# Patient Record
Sex: FEMALE | URBAN - METROPOLITAN AREA
[De-identification: names, ages, dates, MRNs, and addresses within clinical notes are randomized per-mention and may not be internally consistent; named-entity substitution may affect disease eponyms.]

---

## 2018-07-06 ENCOUNTER — EMERGENCY (EMERGENCY)
Facility: HOSPITAL | Age: 63
LOS: 1 days | Discharge: HOME | End: 2018-07-06
Attending: EMERGENCY MEDICINE | Admitting: EMERGENCY MEDICINE
Payer: MEDICAID

## 2018-07-06 VITALS
OXYGEN SATURATION: 100 % | DIASTOLIC BLOOD PRESSURE: 85 MMHG | TEMPERATURE: 98 F | HEART RATE: 76 BPM | SYSTOLIC BLOOD PRESSURE: 143 MMHG | RESPIRATION RATE: 16 BRPM

## 2018-07-06 VITALS
HEART RATE: 71 BPM | DIASTOLIC BLOOD PRESSURE: 89 MMHG | OXYGEN SATURATION: 100 % | SYSTOLIC BLOOD PRESSURE: 141 MMHG | RESPIRATION RATE: 16 BRPM

## 2018-07-06 LAB
ALBUMIN SERPL ELPH-MCNC: 3.6 G/DL — SIGNIFICANT CHANGE UP (ref 3.3–5)
ALP SERPL-CCNC: 121 U/L — HIGH (ref 40–120)
ALT FLD-CCNC: 25 U/L — SIGNIFICANT CHANGE UP (ref 4–33)
APPEARANCE UR: CLEAR — SIGNIFICANT CHANGE UP
AST SERPL-CCNC: 49 U/L — HIGH (ref 4–32)
BASE EXCESS BLDV CALC-SCNC: 0 MMOL/L — SIGNIFICANT CHANGE UP
BASE EXCESS BLDV CALC-SCNC: 0.9 MMOL/L — SIGNIFICANT CHANGE UP
BASOPHILS # BLD AUTO: 0.03 K/UL — SIGNIFICANT CHANGE UP (ref 0–0.2)
BASOPHILS NFR BLD AUTO: 0.5 % — SIGNIFICANT CHANGE UP (ref 0–2)
BILIRUB SERPL-MCNC: 0.6 MG/DL — SIGNIFICANT CHANGE UP (ref 0.2–1.2)
BILIRUB UR-MCNC: NEGATIVE — SIGNIFICANT CHANGE UP
BLOOD GAS VENOUS - CREATININE: 0.76 MG/DL — SIGNIFICANT CHANGE UP (ref 0.5–1.3)
BLOOD GAS VENOUS - CREATININE: 0.76 MG/DL — SIGNIFICANT CHANGE UP (ref 0.5–1.3)
BLOOD UR QL VISUAL: NEGATIVE — SIGNIFICANT CHANGE UP
BUN SERPL-MCNC: 8 MG/DL — SIGNIFICANT CHANGE UP (ref 7–23)
CALCIUM SERPL-MCNC: 9.9 MG/DL — SIGNIFICANT CHANGE UP (ref 8.4–10.5)
CHLORIDE BLDV-SCNC: 104 MMOL/L — SIGNIFICANT CHANGE UP (ref 96–108)
CHLORIDE BLDV-SCNC: 105 MMOL/L — SIGNIFICANT CHANGE UP (ref 96–108)
CHLORIDE SERPL-SCNC: 98 MMOL/L — SIGNIFICANT CHANGE UP (ref 98–107)
CO2 SERPL-SCNC: 24 MMOL/L — SIGNIFICANT CHANGE UP (ref 22–31)
COLOR SPEC: SIGNIFICANT CHANGE UP
CREAT SERPL-MCNC: 0.8 MG/DL — SIGNIFICANT CHANGE UP (ref 0.5–1.3)
EOSINOPHIL # BLD AUTO: 0.22 K/UL — SIGNIFICANT CHANGE UP (ref 0–0.5)
EOSINOPHIL NFR BLD AUTO: 3.9 % — SIGNIFICANT CHANGE UP (ref 0–6)
GAS PNL BLDV: 136 MMOL/L — SIGNIFICANT CHANGE UP (ref 136–146)
GAS PNL BLDV: 136 MMOL/L — SIGNIFICANT CHANGE UP (ref 136–146)
GLUCOSE BLDV-MCNC: 182 — HIGH (ref 70–99)
GLUCOSE BLDV-MCNC: 187 — HIGH (ref 70–99)
GLUCOSE SERPL-MCNC: 178 MG/DL — HIGH (ref 70–99)
GLUCOSE UR-MCNC: NEGATIVE — SIGNIFICANT CHANGE UP
HCO3 BLDV-SCNC: 24 MMOL/L — SIGNIFICANT CHANGE UP (ref 20–27)
HCO3 BLDV-SCNC: 24 MMOL/L — SIGNIFICANT CHANGE UP (ref 20–27)
HCT VFR BLD CALC: 30.3 % — LOW (ref 34.5–45)
HCT VFR BLDV CALC: 27.7 % — LOW (ref 34.5–45)
HCT VFR BLDV CALC: 32.2 % — LOW (ref 34.5–45)
HGB BLD-MCNC: 9.8 G/DL — LOW (ref 11.5–15.5)
HGB BLDV-MCNC: 10.4 G/DL — LOW (ref 11.5–15.5)
HGB BLDV-MCNC: 8.9 G/DL — LOW (ref 11.5–15.5)
IMM GRANULOCYTES # BLD AUTO: 0.01 # — SIGNIFICANT CHANGE UP
IMM GRANULOCYTES NFR BLD AUTO: 0.2 % — SIGNIFICANT CHANGE UP (ref 0–1.5)
KETONES UR-MCNC: NEGATIVE — SIGNIFICANT CHANGE UP
LACTATE BLDV-MCNC: 3.4 MMOL/L — HIGH (ref 0.5–2)
LACTATE BLDV-MCNC: 5.5 MMOL/L — CRITICAL HIGH (ref 0.5–2)
LEUKOCYTE ESTERASE UR-ACNC: SIGNIFICANT CHANGE UP
LYMPHOCYTES # BLD AUTO: 1.63 K/UL — SIGNIFICANT CHANGE UP (ref 1–3.3)
LYMPHOCYTES # BLD AUTO: 29.2 % — SIGNIFICANT CHANGE UP (ref 13–44)
MCHC RBC-ENTMCNC: 26.8 PG — LOW (ref 27–34)
MCHC RBC-ENTMCNC: 32.3 % — SIGNIFICANT CHANGE UP (ref 32–36)
MCV RBC AUTO: 83 FL — SIGNIFICANT CHANGE UP (ref 80–100)
MONOCYTES # BLD AUTO: 0.41 K/UL — SIGNIFICANT CHANGE UP (ref 0–0.9)
MONOCYTES NFR BLD AUTO: 7.3 % — SIGNIFICANT CHANGE UP (ref 2–14)
NEUTROPHILS # BLD AUTO: 3.28 K/UL — SIGNIFICANT CHANGE UP (ref 1.8–7.4)
NEUTROPHILS NFR BLD AUTO: 58.9 % — SIGNIFICANT CHANGE UP (ref 43–77)
NITRITE UR-MCNC: NEGATIVE — SIGNIFICANT CHANGE UP
NON-SQ EPI CELLS # UR AUTO: <1 — SIGNIFICANT CHANGE UP
NRBC # FLD: 0 — SIGNIFICANT CHANGE UP
PCO2 BLDV: 44 MMHG — SIGNIFICANT CHANGE UP (ref 41–51)
PCO2 BLDV: 47 MMHG — SIGNIFICANT CHANGE UP (ref 41–51)
PH BLDV: 7.35 PH — SIGNIFICANT CHANGE UP (ref 7.32–7.43)
PH BLDV: 7.38 PH — SIGNIFICANT CHANGE UP (ref 7.32–7.43)
PH UR: 7 — SIGNIFICANT CHANGE UP (ref 4.6–8)
PLATELET # BLD AUTO: 152 K/UL — SIGNIFICANT CHANGE UP (ref 150–400)
PMV BLD: 9.8 FL — SIGNIFICANT CHANGE UP (ref 7–13)
PO2 BLDV: 27 MMHG — LOW (ref 35–40)
PO2 BLDV: 29 MMHG — LOW (ref 35–40)
POTASSIUM BLDV-SCNC: 3.2 MMOL/L — LOW (ref 3.4–4.5)
POTASSIUM BLDV-SCNC: 4.5 MMOL/L — SIGNIFICANT CHANGE UP (ref 3.4–4.5)
POTASSIUM SERPL-MCNC: 3.3 MMOL/L — LOW (ref 3.5–5.3)
POTASSIUM SERPL-SCNC: 3.3 MMOL/L — LOW (ref 3.5–5.3)
PROT SERPL-MCNC: 8.1 G/DL — SIGNIFICANT CHANGE UP (ref 6–8.3)
PROT UR-MCNC: NEGATIVE MG/DL — SIGNIFICANT CHANGE UP
RBC # BLD: 3.65 M/UL — LOW (ref 3.8–5.2)
RBC # FLD: 16.3 % — HIGH (ref 10.3–14.5)
RBC CASTS # UR COMP ASSIST: SIGNIFICANT CHANGE UP (ref 0–?)
SAO2 % BLDV: 41 % — LOW (ref 60–85)
SAO2 % BLDV: 48.3 % — LOW (ref 60–85)
SODIUM SERPL-SCNC: 137 MMOL/L — SIGNIFICANT CHANGE UP (ref 135–145)
SP GR SPEC: 1.01 — SIGNIFICANT CHANGE UP (ref 1–1.04)
SQUAMOUS # UR AUTO: SIGNIFICANT CHANGE UP
TROPONIN T, HIGH SENSITIVITY: 10 NG/L — SIGNIFICANT CHANGE UP (ref ?–14)
TROPONIN T, HIGH SENSITIVITY: 10 NG/L — SIGNIFICANT CHANGE UP (ref ?–14)
UROBILINOGEN FLD QL: NORMAL MG/DL — SIGNIFICANT CHANGE UP
WBC # BLD: 5.58 K/UL — SIGNIFICANT CHANGE UP (ref 3.8–10.5)
WBC # FLD AUTO: 5.58 K/UL — SIGNIFICANT CHANGE UP (ref 3.8–10.5)
WBC UR QL: SIGNIFICANT CHANGE UP (ref 0–?)

## 2018-07-06 PROCEDURE — 99284 EMERGENCY DEPT VISIT MOD MDM: CPT

## 2018-07-06 RX ORDER — METOCLOPRAMIDE HCL 10 MG
10 TABLET ORAL ONCE
Qty: 0 | Refills: 0 | Status: COMPLETED | OUTPATIENT
Start: 2018-07-06 | End: 2018-07-06

## 2018-07-06 RX ORDER — SODIUM CHLORIDE 9 MG/ML
1000 INJECTION INTRAMUSCULAR; INTRAVENOUS; SUBCUTANEOUS ONCE
Qty: 0 | Refills: 0 | Status: COMPLETED | OUTPATIENT
Start: 2018-07-06 | End: 2018-07-06

## 2018-07-06 RX ORDER — POTASSIUM CHLORIDE 20 MEQ
40 PACKET (EA) ORAL ONCE
Qty: 0 | Refills: 0 | Status: COMPLETED | OUTPATIENT
Start: 2018-07-06 | End: 2018-07-06

## 2018-07-06 RX ORDER — MECLIZINE HCL 12.5 MG
1 TABLET ORAL
Qty: 15 | Refills: 0 | OUTPATIENT
Start: 2018-07-06 | End: 2018-07-10

## 2018-07-06 RX ORDER — MECLIZINE HCL 12.5 MG
50 TABLET ORAL ONCE
Qty: 0 | Refills: 0 | Status: COMPLETED | OUTPATIENT
Start: 2018-07-06 | End: 2018-07-06

## 2018-07-06 RX ADMIN — Medication 10 MILLIGRAM(S): at 19:46

## 2018-07-06 RX ADMIN — SODIUM CHLORIDE 1000 MILLILITER(S): 9 INJECTION INTRAMUSCULAR; INTRAVENOUS; SUBCUTANEOUS at 19:47

## 2018-07-06 RX ADMIN — Medication 40 MILLIEQUIVALENT(S): at 22:39

## 2018-07-06 RX ADMIN — Medication 50 MILLIGRAM(S): at 19:46

## 2018-07-06 RX ADMIN — SODIUM CHLORIDE 1000 MILLILITER(S): 9 INJECTION INTRAMUSCULAR; INTRAVENOUS; SUBCUTANEOUS at 20:11

## 2018-07-06 NOTE — ED PROVIDER NOTE - PROGRESS NOTE DETAILS
Patient now feeling better and ambulatory, lactate downtrending and likely from vomiting. Will send with rx for meclizine and inform her to f/u with PMD about anemia. -SM

## 2018-07-06 NOTE — ED ADULT NURSE NOTE - OBJECTIVE STATEMENT
Break coverage RN received pt to spot 3 nonenglish speaking requests son at bedside to translate c/o room spinning associated with nausea and vomiting worsening x 1 day, had this feeling for some times but it has been worse since last night, had NBNB n/v yesterday none today, symptoms are exacerbated by head movement, go away when still. IV placed, labs sent, VS as documented, NSR noted on cardiac monitor, will continue to monitor.

## 2018-07-06 NOTE — ED PROVIDER NOTE - PHYSICAL EXAMINATION
Gen: NAD, AOx3  Head: NCAT  HEENT: PERRL, oral mucosa moist, normal conjunctiva, R TM is clear and shiny, L TM has redness  Lung: CTAB, no respiratory distress  CV: rrr, no murmurs, Normal perfusion  Abd: soft, NTND, no CVA tenderness  MSK: No edema, no visible deformities  Neuro: No focal neurologic deficits, CN intact, motor and sensation intact, no dysmetria, no pronator drift   Skin: No rash   Psych: normal affect

## 2018-07-06 NOTE — ED PROVIDER NOTE - OBJECTIVE STATEMENT
Patient is 63 y F with PMH CAD with stent on ASA 81, htn, dm on oral meds presenting with spinning sensation, n/v since last night. Has had this feeling for some times but it has been worse since last night, had NBNB n/v yesterday none today, symptoms are exacerbated by head movement, go away when still. No trauma. No headache, no vision changes. Has R ear discomfort  Walks very slowly at baseline, sometimes with walker    PMD: Maria Isabel Sybria  ROS: Denies fever, palpitations, chills, recent sickness, HA, vision changes, cough, SOB, chest pain, abdominal pain, dysuria, hematuria, rash, new joint aches, sick contacts, and recent travel. Patient is 63 y F with PMH CAD with stent on ASA 81, htn, dm on oral meds presenting with spinning sensation, n/v since last night. Has had this feeling for some time but it has been worse since last night, had NBNB n/v yesterday none today, symptoms are exacerbated by head movement, go away when still. No trauma. No headache, no vision changes. Has R ear discomfort  Walks very slowly at baseline, sometimes with walker    PMD: Maria Isabel Syanal  ROS: Denies fever, palpitations, chills, recent sickness, HA, vision changes, cough, SOB, chest pain, abdominal pain, dysuria, hematuria, rash, new joint aches, sick contacts, and recent travel.

## 2018-07-06 NOTE — ED PROVIDER NOTE - ATTENDING CONTRIBUTION TO CARE
Dr. Baxter:  I have personally performed a face to face bedside history and physical examination of this patient. I have discussed the history, examination, review of systems, assessment and plan of management with the resident. I have reviewed the electronic medical record and amended it to reflect my history, review of systems, physical exam, assessment and plan.    63F h/o CAD with stent on asa, htn, dm, presents with intermittent sensation of room spinning, worse over past couple days.  Yesterday started having N/V and feels spinning sensation much worse today.  Symptoms exacerbated by head movement, asymptomatic if remains still.  Denies fever/chills, cp, sob, changes in vision.  +R ear discomfort    Exam:  - nad  - rrr  - ctab  - abd soft ntnd  - no focal neuro deficits, strength and sensation equal bilaterally, finger to nose intact, non-ataxic gait    A/P  - likely BPPV  - cbc, cmp, trop, ua, urine culture  - ekg  - cxr  - meclizine, reassess

## 2018-07-06 NOTE — ED PROVIDER NOTE - MEDICAL DECISION MAKING DETAILS
Likely peripheral vertigo. Will defer treatment of possible otitis, likely viral no fevers, have f/u with ENT. Plans: fluids, reglan, meclizine, labs, ua with cx

## 2018-07-08 LAB
BACTERIA UR CULT: SIGNIFICANT CHANGE UP
SPECIMEN SOURCE: SIGNIFICANT CHANGE UP

## 2018-10-07 ENCOUNTER — INPATIENT (INPATIENT)
Facility: HOSPITAL | Age: 63
LOS: 4 days | Discharge: HOME | End: 2018-10-12
Attending: INTERNAL MEDICINE | Admitting: INTERNAL MEDICINE
Payer: MEDICAID

## 2018-10-07 VITALS
DIASTOLIC BLOOD PRESSURE: 88 MMHG | OXYGEN SATURATION: 100 % | SYSTOLIC BLOOD PRESSURE: 142 MMHG | RESPIRATION RATE: 16 BRPM | TEMPERATURE: 98 F | HEART RATE: 90 BPM

## 2018-10-07 DIAGNOSIS — R41.82 ALTERED MENTAL STATUS, UNSPECIFIED: ICD-10-CM

## 2018-10-07 DIAGNOSIS — Z87.442 PERSONAL HISTORY OF URINARY CALCULI: Chronic | ICD-10-CM

## 2018-10-07 PROBLEM — E11.9 TYPE 2 DIABETES MELLITUS WITHOUT COMPLICATIONS: Chronic | Status: ACTIVE | Noted: 2018-07-06

## 2018-10-07 PROBLEM — I10 ESSENTIAL (PRIMARY) HYPERTENSION: Chronic | Status: ACTIVE | Noted: 2018-07-06

## 2018-10-07 LAB
ALBUMIN SERPL ELPH-MCNC: 3.8 G/DL — SIGNIFICANT CHANGE UP (ref 3.3–5)
ALP SERPL-CCNC: 151 U/L — HIGH (ref 40–120)
ALT FLD-CCNC: 28 U/L — SIGNIFICANT CHANGE UP (ref 4–33)
APAP SERPL-MCNC: < 15 UG/ML — LOW (ref 15–25)
APTT BLD: 33.6 SEC — SIGNIFICANT CHANGE UP (ref 27.5–37.4)
AST SERPL-CCNC: 50 U/L — HIGH (ref 4–32)
BASOPHILS # BLD AUTO: 0.06 K/UL — SIGNIFICANT CHANGE UP (ref 0–0.2)
BASOPHILS NFR BLD AUTO: 0.8 % — SIGNIFICANT CHANGE UP (ref 0–2)
BILIRUB SERPL-MCNC: 0.8 MG/DL — SIGNIFICANT CHANGE UP (ref 0.2–1.2)
BUN SERPL-MCNC: 7 MG/DL — SIGNIFICANT CHANGE UP (ref 7–23)
CALCIUM SERPL-MCNC: 10.6 MG/DL — HIGH (ref 8.4–10.5)
CHLORIDE SERPL-SCNC: 98 MMOL/L — SIGNIFICANT CHANGE UP (ref 98–107)
CO2 SERPL-SCNC: 23 MMOL/L — SIGNIFICANT CHANGE UP (ref 22–31)
CREAT SERPL-MCNC: 0.79 MG/DL — SIGNIFICANT CHANGE UP (ref 0.5–1.3)
EOSINOPHIL # BLD AUTO: 0.27 K/UL — SIGNIFICANT CHANGE UP (ref 0–0.5)
EOSINOPHIL NFR BLD AUTO: 3.6 % — SIGNIFICANT CHANGE UP (ref 0–6)
ETHANOL BLD-MCNC: < 10 MG/DL — SIGNIFICANT CHANGE UP
FOLATE SERPL-MCNC: 18.4 NG/ML — SIGNIFICANT CHANGE UP (ref 4.7–20)
GGT SERPL-CCNC: 57 U/L — HIGH (ref 5–36)
GLUCOSE SERPL-MCNC: 94 MG/DL — SIGNIFICANT CHANGE UP (ref 70–99)
HCT VFR BLD CALC: 37.7 % — SIGNIFICANT CHANGE UP (ref 34.5–45)
HGB BLD-MCNC: 12.5 G/DL — SIGNIFICANT CHANGE UP (ref 11.5–15.5)
IMM GRANULOCYTES # BLD AUTO: 0.02 # — SIGNIFICANT CHANGE UP
IMM GRANULOCYTES NFR BLD AUTO: 0.3 % — SIGNIFICANT CHANGE UP (ref 0–1.5)
INR BLD: 1.26 — HIGH (ref 0.88–1.17)
LYMPHOCYTES # BLD AUTO: 2.21 K/UL — SIGNIFICANT CHANGE UP (ref 1–3.3)
LYMPHOCYTES # BLD AUTO: 29.2 % — SIGNIFICANT CHANGE UP (ref 13–44)
MCHC RBC-ENTMCNC: 28.5 PG — SIGNIFICANT CHANGE UP (ref 27–34)
MCHC RBC-ENTMCNC: 33.2 % — SIGNIFICANT CHANGE UP (ref 32–36)
MCV RBC AUTO: 85.9 FL — SIGNIFICANT CHANGE UP (ref 80–100)
MONOCYTES # BLD AUTO: 0.62 K/UL — SIGNIFICANT CHANGE UP (ref 0–0.9)
MONOCYTES NFR BLD AUTO: 8.2 % — SIGNIFICANT CHANGE UP (ref 2–14)
NEUTROPHILS # BLD AUTO: 4.38 K/UL — SIGNIFICANT CHANGE UP (ref 1.8–7.4)
NEUTROPHILS NFR BLD AUTO: 57.9 % — SIGNIFICANT CHANGE UP (ref 43–77)
NRBC # FLD: 0 — SIGNIFICANT CHANGE UP
PLATELET # BLD AUTO: 169 K/UL — SIGNIFICANT CHANGE UP (ref 150–400)
PMV BLD: 9.7 FL — SIGNIFICANT CHANGE UP (ref 7–13)
POTASSIUM SERPL-MCNC: 3.2 MMOL/L — LOW (ref 3.5–5.3)
POTASSIUM SERPL-SCNC: 3.2 MMOL/L — LOW (ref 3.5–5.3)
PROT SERPL-MCNC: 9.5 G/DL — HIGH (ref 6–8.3)
PROTHROM AB SERPL-ACNC: 14.5 SEC — HIGH (ref 9.8–13.1)
RBC # BLD: 4.39 M/UL — SIGNIFICANT CHANGE UP (ref 3.8–5.2)
RBC # FLD: 14.4 % — SIGNIFICANT CHANGE UP (ref 10.3–14.5)
SALICYLATES SERPL-MCNC: < 5 MG/DL — LOW (ref 15–30)
SODIUM SERPL-SCNC: 139 MMOL/L — SIGNIFICANT CHANGE UP (ref 135–145)
TROPONIN T, HIGH SENSITIVITY: 11 NG/L — SIGNIFICANT CHANGE UP (ref ?–14)
TSH SERPL-MCNC: 4.29 UIU/ML — HIGH (ref 0.27–4.2)
VIT B12 SERPL-MCNC: 418 PG/ML — SIGNIFICANT CHANGE UP (ref 200–900)
WBC # BLD: 7.56 K/UL — SIGNIFICANT CHANGE UP (ref 3.8–10.5)
WBC # FLD AUTO: 7.56 K/UL — SIGNIFICANT CHANGE UP (ref 3.8–10.5)

## 2018-10-07 PROCEDURE — 70496 CT ANGIOGRAPHY HEAD: CPT | Mod: 26

## 2018-10-07 PROCEDURE — 71045 X-RAY EXAM CHEST 1 VIEW: CPT | Mod: 26

## 2018-10-07 PROCEDURE — 70498 CT ANGIOGRAPHY NECK: CPT | Mod: 26

## 2018-10-07 RX ORDER — SODIUM CHLORIDE 9 MG/ML
1000 INJECTION INTRAMUSCULAR; INTRAVENOUS; SUBCUTANEOUS ONCE
Qty: 0 | Refills: 0 | Status: COMPLETED | OUTPATIENT
Start: 2018-10-07 | End: 2018-10-07

## 2018-10-07 RX ORDER — SODIUM CHLORIDE 9 MG/ML
500 INJECTION INTRAMUSCULAR; INTRAVENOUS; SUBCUTANEOUS ONCE
Qty: 0 | Refills: 0 | Status: COMPLETED | OUTPATIENT
Start: 2018-10-07 | End: 2018-10-07

## 2018-10-07 RX ORDER — POTASSIUM CHLORIDE 20 MEQ
40 PACKET (EA) ORAL ONCE
Qty: 0 | Refills: 0 | Status: COMPLETED | OUTPATIENT
Start: 2018-10-07 | End: 2018-10-07

## 2018-10-07 RX ADMIN — SODIUM CHLORIDE 1000 MILLILITER(S): 9 INJECTION INTRAMUSCULAR; INTRAVENOUS; SUBCUTANEOUS at 18:06

## 2018-10-07 RX ADMIN — Medication 40 MILLIEQUIVALENT(S): at 19:19

## 2018-10-07 RX ADMIN — SODIUM CHLORIDE 1000 MILLILITER(S): 9 INJECTION INTRAMUSCULAR; INTRAVENOUS; SUBCUTANEOUS at 19:19

## 2018-10-07 RX ADMIN — SODIUM CHLORIDE 500 MILLILITER(S): 9 INJECTION INTRAMUSCULAR; INTRAVENOUS; SUBCUTANEOUS at 21:00

## 2018-10-07 RX ADMIN — SODIUM CHLORIDE 1000 MILLILITER(S): 9 INJECTION INTRAMUSCULAR; INTRAVENOUS; SUBCUTANEOUS at 21:00

## 2018-10-07 NOTE — ED ADULT NURSE NOTE - OBJECTIVE STATEMENT
Pt w/ hx of htn DM aaox3 at baseline ambulatory received to rm 29 aaox2 ambulating w/ assistance Wolof speaking son luann at bedside translates.  Pt's son reports Pt woke this am and exhibited bizarre behavior, confusion and weakness.  Pt p/w weakness NAD breaths even unlabored skin warm dry intact calm cooperative affect.  Pt presentation presented by author to Dr. Hoffman rectal temp obtained as ordered. awaiting MD orders Pt w/ hx of htn DM aaox3 at baseline ambulatory received to rm 29 aaox2 ambulating w/ assistance Maori speaking son luann at bedside translates.  Pt's son reports Pt woke this am and exhibited bizarre behavior, confusion and weakness.  Pt p/w weakness NAD breaths even unlabored skin warm dry intact calm cooperative affect.  Pt presentation presented by author to Dr. Hoffman rectal temp obtained as ordered. awaiting MD orders  Report given by PM RN. Pt is alert and oriented times three with daughter at bedside. Pt is here for the c/o patient's episode of altered mental status. Pt also had c/o abdominal pain with no N/V/D. Pt is comfortable. Has gone to ultrasound and is waiting for bed assignment. Pt is admitted for AMS.   EDUARDO Clement

## 2018-10-07 NOTE — ED ADULT NURSE NOTE - NSIMPLEMENTINTERV_GEN_ALL_ED
Implemented All Fall Risk Interventions:  [place] to call system. Call bell, personal items and telephone within reach. Instruct patient to call for assistance. Room bathroom lighting operational. Non-slip footwear when patient is off stretcher. Physically safe environment: no spills, clutter or unnecessary equipment. Stretcher in lowest position, wheels locked, appropriate side rails in place. Provide visual cue, wrist band, yellow gown, etc. Monitor gait and stability. Monitor for mental status changes and reorient to person, place, and time. Review medications for side effects contributing to fall risk. Reinforce activity limits and safety measures with patient and family.

## 2018-10-07 NOTE — ED ADULT TRIAGE NOTE - CHIEF COMPLAINT QUOTE
Polish speaking as per family  when pt awakened at 7am diff walking with confusion. pt with weakness this am,diff walking  when given food,pt chewed dress.   fs 202 family reports pt c/o pain  teeth  x2 days with decreased appetite.  denies ch   pmh- dm,htn,cad Belarusian speaking as per family  when pt awakened at 7am diff walking with confusion. pt with weakness this am,diff walking  when given food,pt chewed dress.   fs 202 family reports pt c/o pain  teeth  x2 days with decreased appetite.  denies ch   pmh- dm,htn,cad  fit md to ilya

## 2018-10-07 NOTE — ED PROVIDER NOTE - PHYSICAL EXAMINATION
PHYSICAL EXAM:  GENERAL: mild distress and agitation   EYES: PERRLA, conjunctiva and sclera clear  ENMT: No tonsillar erythema, exudates, or enlargement; Moist mucous membranes, poor dentition   CHEST/LUNG: Clear to percussion bilaterally; No rales, rhonchi, wheezing, or rubs  HEART: Regular rate and rhythm; No murmurs, rubs, or gallops  ABDOMEN: Soft, Nondistended  EXTREMITIES:  2+ Peripheral Pulses  SKIN: No rashes or lesions  NERVOUS SYSTEM:  Alert & Oriented X2 to person and place, poor concentration; Motor Strength 5/5 B/L upper and lower extremities

## 2018-10-07 NOTE — ED PROVIDER NOTE - PROGRESS NOTE DETAILS
Cristian: pt slowly improving mental status. imaging negative, UA still pending ,will admit for AMS w/u.

## 2018-10-07 NOTE — ED PROVIDER NOTE - ATTENDING CONTRIBUTION TO CARE
Dr. Foster: I have personally performed a face to face bedside history and physical examination of this patient. I have discussed the history, examination, review of systems, assessment and plan of management with the resident. I have reviewed the electronic medical record and amended it to reflect my history, review of systems, physical exam, assessment and plan.      Pt with pmh of HTN, DM here with AMS/confusion since this morning. Last seen normal yesterday evening. No recent illness or head trauma.  o Dr. Foster: I have personally performed a face to face bedside history and physical examination of this patient. I have discussed the history, examination, review of systems, assessment and plan of management with the resident. I have reviewed the electronic medical record and amended it to reflect my history, review of systems, physical exam, assessment and plan.      Pt with pmh of HTN, DM here with AMS/confusion since this morning. Last seen normal yesterday evening. No recent illness or head trauma. Czech speaking only, translation provided by daughter.    on exam confused, AxOx1, afebrile  PERRLA/EOMI  MM dry  neck supple  CTA b/l  RRR s1s2  abd soft nt/nd  moving all extremities, no pronator drift, not cooperative with the rest of exam.    r/o hypo/hyperglycemia, ICH, intracranial mass, stroke, electrolyte abnormality, underlying infection. do not think CNS infection. Plan for labs, CT head, CXR, UA, admit.

## 2018-10-07 NOTE — ED PROVIDER NOTE - OBJECTIVE STATEMENT
62 yo F with pmhx of DMT2, HTN, iron deficiency anemia, anxiety/ depression presents from home with altered mental status. Patient is unable to provide complete history. Daughter is at bedside- reports that patient woke up this morning and was acting odd. When she went to the bathroom in the morning, she tried to drink water out of the sink and tried to lift her foot into the sink. She had an unsteady gait and required assistance to the dining table for breakfast. During breakfast, when apples were handed to her to eat, she took her clothes and started biting on it, mistaking it for the apples. Daughter denies sick contacts or URI symptoms. Patient reports she is having some abdominal pain but no fevers, nausea/ vomiting. Patient initially kept responding to questions with her name as that was the first question, but after several minutes was able to participate in interview

## 2018-10-07 NOTE — ED ADULT NURSE NOTE - CHIEF COMPLAINT QUOTE
Japanese speaking as per family  when pt awakened at 7am diff walking with confusion. pt with weakness this am,diff walking  when given food,pt chewed dress.   fs 202 family reports pt c/o pain  teeth  x2 days with decreased appetite.  denies ch   pmh- dm,htn,cad  fit md to ilya

## 2018-10-07 NOTE — ED PROVIDER NOTE - MEDICAL DECISION MAKING DETAILS
Samantha speaking F presents with altered mental status, likely metabolic in nature. WIll obtain labs and CT Head

## 2018-10-08 DIAGNOSIS — D68.9 COAGULATION DEFECT, UNSPECIFIED: ICD-10-CM

## 2018-10-08 DIAGNOSIS — E87.6 HYPOKALEMIA: ICD-10-CM

## 2018-10-08 DIAGNOSIS — R74.8 ABNORMAL LEVELS OF OTHER SERUM ENZYMES: ICD-10-CM

## 2018-10-08 DIAGNOSIS — R79.89 OTHER SPECIFIED ABNORMAL FINDINGS OF BLOOD CHEMISTRY: ICD-10-CM

## 2018-10-08 DIAGNOSIS — R41.82 ALTERED MENTAL STATUS, UNSPECIFIED: ICD-10-CM

## 2018-10-08 DIAGNOSIS — E83.52 HYPERCALCEMIA: ICD-10-CM

## 2018-10-08 DIAGNOSIS — E11.9 TYPE 2 DIABETES MELLITUS WITHOUT COMPLICATIONS: ICD-10-CM

## 2018-10-08 DIAGNOSIS — I10 ESSENTIAL (PRIMARY) HYPERTENSION: ICD-10-CM

## 2018-10-08 DIAGNOSIS — Z29.9 ENCOUNTER FOR PROPHYLACTIC MEASURES, UNSPECIFIED: ICD-10-CM

## 2018-10-08 DIAGNOSIS — I25.10 ATHEROSCLEROTIC HEART DISEASE OF NATIVE CORONARY ARTERY WITHOUT ANGINA PECTORIS: ICD-10-CM

## 2018-10-08 LAB
AMMONIA BLD-MCNC: 106 UMOL/L — HIGH (ref 11–55)
AMPHET UR-MCNC: NEGATIVE — SIGNIFICANT CHANGE UP
APPEARANCE UR: CLEAR — SIGNIFICANT CHANGE UP
BARBITURATES UR SCN-MCNC: NEGATIVE — SIGNIFICANT CHANGE UP
BENZODIAZ UR-MCNC: NEGATIVE — SIGNIFICANT CHANGE UP
BILIRUB UR-MCNC: NEGATIVE — SIGNIFICANT CHANGE UP
BLOOD UR QL VISUAL: NEGATIVE — SIGNIFICANT CHANGE UP
CANNABINOIDS UR-MCNC: NEGATIVE — SIGNIFICANT CHANGE UP
CHOLEST SERPL-MCNC: 142 MG/DL — SIGNIFICANT CHANGE UP (ref 120–199)
COCAINE METAB.OTHER UR-MCNC: NEGATIVE — SIGNIFICANT CHANGE UP
COLOR SPEC: YELLOW — SIGNIFICANT CHANGE UP
GLUCOSE UR-MCNC: NEGATIVE — SIGNIFICANT CHANGE UP
HAV IGM SER-ACNC: NONREACTIVE — SIGNIFICANT CHANGE UP
HBA1C BLD-MCNC: 7.7 % — HIGH (ref 4–5.6)
HBV CORE IGM SER-ACNC: NONREACTIVE — SIGNIFICANT CHANGE UP
HBV SURFACE AG SER-ACNC: REACTIVE — SIGNIFICANT CHANGE UP
HCV AB S/CO SERPL IA: 0.11 S/CO — SIGNIFICANT CHANGE UP
HCV AB SERPL-IMP: SIGNIFICANT CHANGE UP
HDLC SERPL-MCNC: 42 MG/DL — LOW (ref 45–65)
KETONES UR-MCNC: NEGATIVE — SIGNIFICANT CHANGE UP
LEUKOCYTE ESTERASE UR-ACNC: NEGATIVE — SIGNIFICANT CHANGE UP
LIPID PNL WITH DIRECT LDL SERPL: 95 MG/DL — SIGNIFICANT CHANGE UP
METHADONE UR-MCNC: NEGATIVE — SIGNIFICANT CHANGE UP
NITRITE UR-MCNC: NEGATIVE — SIGNIFICANT CHANGE UP
OPIATES UR-MCNC: NEGATIVE — SIGNIFICANT CHANGE UP
OXYCODONE UR-MCNC: NEGATIVE — SIGNIFICANT CHANGE UP
PCP UR-MCNC: NEGATIVE — SIGNIFICANT CHANGE UP
PH UR: 8 — SIGNIFICANT CHANGE UP (ref 5–8)
PROT SERPL-MCNC: 7.3 G/DL — SIGNIFICANT CHANGE UP (ref 6–8.3)
PROT UR-MCNC: 8.4 MG/DL — SIGNIFICANT CHANGE UP
PROT UR-MCNC: NEGATIVE — SIGNIFICANT CHANGE UP
SP GR SPEC: 1.05 — HIGH (ref 1–1.04)
TRIGL SERPL-MCNC: 94 MG/DL — SIGNIFICANT CHANGE UP (ref 10–149)
TSH SERPL-MCNC: 5.45 UIU/ML — HIGH (ref 0.27–4.2)
UROBILINOGEN FLD QL: NORMAL — SIGNIFICANT CHANGE UP

## 2018-10-08 PROCEDURE — 99222 1ST HOSP IP/OBS MODERATE 55: CPT | Mod: GC

## 2018-10-08 PROCEDURE — 74177 CT ABD & PELVIS W/CONTRAST: CPT | Mod: 26

## 2018-10-08 PROCEDURE — 76700 US EXAM ABDOM COMPLETE: CPT | Mod: 26

## 2018-10-08 PROCEDURE — 76536 US EXAM OF HEAD AND NECK: CPT | Mod: 26

## 2018-10-08 PROCEDURE — 84166 PROTEIN E-PHORESIS/URINE/CSF: CPT | Mod: 26

## 2018-10-08 PROCEDURE — 12345: CPT | Mod: NC

## 2018-10-08 PROCEDURE — 99223 1ST HOSP IP/OBS HIGH 75: CPT | Mod: GC

## 2018-10-08 PROCEDURE — 86334 IMMUNOFIX E-PHORESIS SERUM: CPT | Mod: 26

## 2018-10-08 PROCEDURE — 70450 CT HEAD/BRAIN W/O DYE: CPT | Mod: 26

## 2018-10-08 PROCEDURE — 84165 PROTEIN E-PHORESIS SERUM: CPT | Mod: 26

## 2018-10-08 RX ORDER — PANTOPRAZOLE SODIUM 20 MG/1
40 TABLET, DELAYED RELEASE ORAL
Qty: 0 | Refills: 0 | Status: DISCONTINUED | OUTPATIENT
Start: 2018-10-08 | End: 2018-10-12

## 2018-10-08 RX ORDER — DEXTROSE 50 % IN WATER 50 %
25 SYRINGE (ML) INTRAVENOUS ONCE
Qty: 0 | Refills: 0 | Status: DISCONTINUED | OUTPATIENT
Start: 2018-10-08 | End: 2018-10-12

## 2018-10-08 RX ORDER — ACETAMINOPHEN 500 MG
650 TABLET ORAL ONCE
Qty: 0 | Refills: 0 | Status: COMPLETED | OUTPATIENT
Start: 2018-10-08 | End: 2018-10-08

## 2018-10-08 RX ORDER — NORTRIPTYLINE HYDROCHLORIDE 10 MG/1
50 CAPSULE ORAL DAILY
Qty: 0 | Refills: 0 | Status: DISCONTINUED | OUTPATIENT
Start: 2018-10-08 | End: 2018-10-12

## 2018-10-08 RX ORDER — LACTULOSE 10 G/15ML
20 SOLUTION ORAL THREE TIMES A DAY
Qty: 0 | Refills: 0 | Status: DISCONTINUED | OUTPATIENT
Start: 2018-10-08 | End: 2018-10-12

## 2018-10-08 RX ORDER — ENOXAPARIN SODIUM 100 MG/ML
40 INJECTION SUBCUTANEOUS EVERY 24 HOURS
Qty: 0 | Refills: 0 | Status: DISCONTINUED | OUTPATIENT
Start: 2018-10-08 | End: 2018-10-12

## 2018-10-08 RX ORDER — DEXTROSE 50 % IN WATER 50 %
12.5 SYRINGE (ML) INTRAVENOUS ONCE
Qty: 0 | Refills: 0 | Status: DISCONTINUED | OUTPATIENT
Start: 2018-10-08 | End: 2018-10-12

## 2018-10-08 RX ORDER — ASPIRIN/CALCIUM CARB/MAGNESIUM 324 MG
81 TABLET ORAL DAILY
Qty: 0 | Refills: 0 | Status: DISCONTINUED | OUTPATIENT
Start: 2018-10-08 | End: 2018-10-12

## 2018-10-08 RX ORDER — INSULIN LISPRO 100/ML
VIAL (ML) SUBCUTANEOUS AT BEDTIME
Qty: 0 | Refills: 0 | Status: DISCONTINUED | OUTPATIENT
Start: 2018-10-08 | End: 2018-10-12

## 2018-10-08 RX ORDER — LOSARTAN POTASSIUM 100 MG/1
100 TABLET, FILM COATED ORAL DAILY
Qty: 0 | Refills: 0 | Status: DISCONTINUED | OUTPATIENT
Start: 2018-10-08 | End: 2018-10-08

## 2018-10-08 RX ORDER — SODIUM CHLORIDE 9 MG/ML
1000 INJECTION, SOLUTION INTRAVENOUS
Qty: 0 | Refills: 0 | Status: DISCONTINUED | OUTPATIENT
Start: 2018-10-08 | End: 2018-10-12

## 2018-10-08 RX ORDER — GLUCAGON INJECTION, SOLUTION 0.5 MG/.1ML
1 INJECTION, SOLUTION SUBCUTANEOUS ONCE
Qty: 0 | Refills: 0 | Status: DISCONTINUED | OUTPATIENT
Start: 2018-10-08 | End: 2018-10-12

## 2018-10-08 RX ORDER — ATORVASTATIN CALCIUM 80 MG/1
40 TABLET, FILM COATED ORAL AT BEDTIME
Qty: 0 | Refills: 0 | Status: DISCONTINUED | OUTPATIENT
Start: 2018-10-08 | End: 2018-10-12

## 2018-10-08 RX ORDER — INSULIN LISPRO 100/ML
VIAL (ML) SUBCUTANEOUS
Qty: 0 | Refills: 0 | Status: DISCONTINUED | OUTPATIENT
Start: 2018-10-08 | End: 2018-10-12

## 2018-10-08 RX ORDER — DEXTROSE 50 % IN WATER 50 %
15 SYRINGE (ML) INTRAVENOUS ONCE
Qty: 0 | Refills: 0 | Status: DISCONTINUED | OUTPATIENT
Start: 2018-10-08 | End: 2018-10-12

## 2018-10-08 RX ADMIN — PANTOPRAZOLE SODIUM 40 MILLIGRAM(S): 20 TABLET, DELAYED RELEASE ORAL at 09:30

## 2018-10-08 RX ADMIN — Medication 650 MILLIGRAM(S): at 21:02

## 2018-10-08 RX ADMIN — Medication 1: at 14:31

## 2018-10-08 RX ADMIN — Medication 650 MILLIGRAM(S): at 22:02

## 2018-10-08 RX ADMIN — ENOXAPARIN SODIUM 40 MILLIGRAM(S): 100 INJECTION SUBCUTANEOUS at 06:01

## 2018-10-08 RX ADMIN — NORTRIPTYLINE HYDROCHLORIDE 50 MILLIGRAM(S): 10 CAPSULE ORAL at 15:26

## 2018-10-08 RX ADMIN — LACTULOSE 20 GRAM(S): 10 SOLUTION ORAL at 21:02

## 2018-10-08 RX ADMIN — ATORVASTATIN CALCIUM 40 MILLIGRAM(S): 80 TABLET, FILM COATED ORAL at 21:02

## 2018-10-08 RX ADMIN — Medication 2: at 17:53

## 2018-10-08 RX ADMIN — LACTULOSE 20 GRAM(S): 10 SOLUTION ORAL at 06:01

## 2018-10-08 RX ADMIN — Medication 81 MILLIGRAM(S): at 14:39

## 2018-10-08 RX ADMIN — Medication 1: at 10:12

## 2018-10-08 RX ADMIN — LACTULOSE 20 GRAM(S): 10 SOLUTION ORAL at 15:26

## 2018-10-08 NOTE — H&P ADULT - PROBLEM SELECTOR PLAN 7
- Not currently on statin or aspirin  - Will start Lipitor 40mg daily and aspirin 81mg - NIDDM2  - On Metformin at home  - Follow up A1c in the AM  - SSI

## 2018-10-08 NOTE — H&P ADULT - PROBLEM SELECTOR PLAN 4
- Elevated AST and alkaline phosphatase. Elevated GGT  - Trend liver enzymes  - Follow up hepatitis panel  - Consider US abdomen - Elevated AST and alkaline phosphatase. Elevated GGT  - Trend liver enzymes  - Follow up hepatitis panel, US abdomen given coagulapathy

## 2018-10-08 NOTE — ED ADULT NURSE REASSESSMENT NOTE - NS ED NURSE REASSESS COMMENT FT1
pt resting comfortably in bed AOX3 denies any complaints at this time, family at bedside VSS breaths equal and unlabored will continue to monitor

## 2018-10-08 NOTE — PHYSICAL THERAPY INITIAL EVALUATION ADULT - ADDITIONAL COMMENTS
Pt's prior level of function was obtained from son @bedside. Pt lives in a private house with son with ~8STE; (+)1 handrail; and a flight of stairs to negotiate to bedroom in the basement. Prior to hospital admission pt was completely independent and used no assistive device with ambulation. Pt has not had any recent falls.    Pt left comfortable on stretcher, NAD, all lines intact, all precautions maintained, with son near by, and RN aware of PT evaluation.

## 2018-10-08 NOTE — PHYSICAL THERAPY INITIAL EVALUATION ADULT - GENERAL OBSERVATIONS, REHAB EVAL
Pt encountered in semisupine position on stretcher in the ED, no distress, Axox2, with +IV, and son @bedside.

## 2018-10-08 NOTE — H&P ADULT - NSHPPHYSICALEXAM_GEN_ALL_CORE
VS: 142/88, HR 90, afebrile, SpO2 100% on RA  GENERAL: NAD  HEAD:  Atraumatic, Normocephalic  EYES: EOMI, conjunctiva and sclera clear  NECK: Supple, No JVD  CHEST/LUNG: Clear to auscultation bilaterally; No wheeze  HEART: Regular rate and rhythm; No murmurs, rubs, or gallops  ABDOMEN: Soft, Nontender, Nondistended; Bowel sounds present  EXTREMITIES:  2+ Peripheral Pulses, No clubbing, cyanosis, or edema  PSYCH: AAOx3  NEUROLOGY: non-focal, CN II-XII groslly intact. Sensation intact. Muscle strength 5/5 on UE/LE  SKIN: Brown/black lesion on neck

## 2018-10-08 NOTE — H&P ADULT - HISTORY OF PRESENT ILLNESS
63F with PMH DMT2, HTN, CAD, anemia presents from home with altered mental status. HPI per sons and daughter. Patient is unable to provide complete history. Symptom started this morning started acting odd. She tried to drink water out of the sink, tried to lift her foot into the sink and was also seen biting on her cloth during breakfast mistaking it for an apple she held She had an unsteady gait and required assistance to the dining table for breakfast. Last seen normal yesterday evening. No recent illness or head trauma. Endorses dysuria few days ago. Denies fever/chills, chest pain, palpitations, chills, recent, headache, vision changes, cough, SOB at rest, abdominal pain, hematuria, rash, numbness, sick contacts, and recent travel.    ED course:  - VS: 142/88, HR 90, afebrile, SpO2 100% on RA  - K 3.2, Tox screen negative   - CXR: No focal consolidations  - CT angiography brain: No acute pathology. No proximal large vessel occlusion.    - Given 1.5L IVF, Potassium 40mEQ 63F with PMH DMT2, HTN, CAD, anemia presents from home with altered mental status. HPI per sons and daughter. Patient is unable to provide complete history. Symptom started this morning started acting odd. She tried to drink water out of the sink, tried to lift her foot into the sink and was also seen biting on her cloth during breakfast mistaking it for an apple she held. She had an unsteady gait and required assistance to the dining table for breakfast. Last seen normal yesterday evening. No recent illness or head trauma. Endorses dysuria few days ago. Denies fever/chills, chest pain, palpitations, chills, recent, headache, vision changes, cough, SOB at rest, abdominal pain, hematuria, rash, numbness, sick contacts, and recent travel.    ED course:  - VS: 142/88, HR 90, afebrile, SpO2 100% on RA  - K 3.2, Tox screen negative   - CXR: No focal consolidations  - CT angiography brain: No acute pathology. No proximal large vessel occlusion.    - Given 1.5L IVF, Potassium 40mEQ

## 2018-10-08 NOTE — H&P ADULT - PROBLEM SELECTOR PLAN 8
- Home antihypertensives: Losartan and HCTZ  - Will continue Losartan for now. Holding HCTZ due to hypokalemia - Not currently on statin or aspirin  - Will start Lipitor 40mg daily and aspirin 81mg

## 2018-10-08 NOTE — PHYSICAL THERAPY INITIAL EVALUATION ADULT - PATIENT PROFILE REVIEW, REHAB EVAL
ACTIVITY: Ambulate with Assistance; spoke with ED RN Yahaira prior to PT evaluation--> Pt OK for PT consult/OOB activity/yes

## 2018-10-08 NOTE — OCCUPATIONAL THERAPY INITIAL EVALUATION ADULT - PERTINENT HX OF CURRENT PROBLEM, REHAB EVAL
63F with PMH DMT2, HTN, CAD, anemia presents from home with AMS .Symptoms started on morning of 10/8 where pt started acting odd. She tried to drink water out of the sink, tried to lift her foot into the sink and was also seen biting on her cloth during breakfast mistaking it for an apple she held. She had an unsteady gait and required assistance to the dining table for breakfast. CT Brain (-).

## 2018-10-08 NOTE — PHYSICAL THERAPY INITIAL EVALUATION ADULT - DIAGNOSIS, PT EVAL
Pt admitted for AMS; Noncontrast CT brain: No acute intracranial hemorrhage, mass effect, or evidence of acute vascular territorial infarction.

## 2018-10-08 NOTE — H&P ADULT - PROBLEM SELECTOR PLAN 5
- No history of thyroid dysfunction  - CTA head/neck showing 1.1 cm left inferior thyroid lobe nodule  - Follow up US thyroid, T3/T4

## 2018-10-08 NOTE — H&P ADULT - PROBLEM SELECTOR PLAN 3
- Given elevated protein gap and history of anemia. Suspicion for multiple myeloma   - S/p 1.5L IVF in the ED  - Follow up ionized calcium in the AM  - IVF PRN

## 2018-10-08 NOTE — CHART NOTE - NSCHARTNOTEFT_GEN_A_CORE
Patient seen and examined. Pain well controlled and patient without any complaints. Daughter at bedside and reports that her mother's mental status is much improved and she appears to be at her baseline.     Vital Signs Last 24 Hrs  T(C): 37.1 (08 Oct 2018 14:48), Max: 37.4 (07 Oct 2018 16:01)  T(F): 98.7 (08 Oct 2018 14:48), Max: 99.4 (07 Oct 2018 16:01)  HR: 91 (08 Oct 2018 14:48) (83 - 91)  BP: 147/85 (08 Oct 2018 14:48) (143/82 - 168/100)  BP(mean): --  RR: 18 (08 Oct 2018 14:48) (16 - 18)  SpO2: 100% (08 Oct 2018 14:48) (100% - 100%)    GENERAL: NAD, well-developed  HEAD:  Atraumatic, Normocephalic  EYES: EOMI, PERRLA, conjunctiva and sclera clear  NECK: Supple, No JVD  CHEST/LUNG: Clear to auscultation bilaterally; No wheeze  HEART: Regular rate and rhythm; No murmurs, rubs, or gallops  ABDOMEN: Soft, Nontender, Nondistended; Bowel sounds present; no hepatomegaly   EXTREMITIES:  2+ Peripheral Pulses, No clubbing, cyanosis, or edema  NEUROLOGY: non-focal    < from: US Abdomen Complete (10.08.18 @ 10:48) >    IMPRESSION:     Multiple ill-defined liver lesions for which a contrast-enhanced MRI or   CT are recommended.    Gallbladder is poorly visualized.    < end of copied text >    < from: US Thyroid + Parathyroid (10.08.18 @ 10:48) >      IMPRESSION:     A 1 cm hypoechoic left lower pole nodule; consider biopsy or a 6 month   follow-up ultrasound.    < end of copied text >    A/P: 63F with PMH DMT2, HTN, CAD, anemia admitted for AMS now resolved found to have multiple liver lesions on ultrasound.  -will consult hepatology for further work-up: MRCP vs. CT and whether patient can follow-up as outpatient.  -patient will need 6-month f/u US of thyroids or consider biopsying left lower pole nodule.    Millicent Yun MD  pager [phone]

## 2018-10-08 NOTE — CONSULT NOTE ADULT - SUBJECTIVE AND OBJECTIVE BOX
GASTROENTEROLOGY INITIAL CONSULT NOTE    Chief Complaint:  Patient is a 63y old  Female who presents with a chief complaint of AMS (08 Oct 2018 02:58)      HPI: 63y Female from [facility] with past medical history hypertension, diabetes type II, CAD who presented with altered mental status. Patient was noted on morning of presentation to be very confused, using her feet instead of her hands, attempting to eat her clothing, and drinking water straight from the sink. She was also noted by family to have unsteady gait, requiring assistance to walk despite normally being independent of all ADLs. Patient has no prior history of altered mental status. She denies trauma or recent falls. Hepatology consulted for liver lesions seen on abdominal ultrasound. Patient has never previously been told of any issues with her liver. She denies blood transfusions or tattoos. She is originally from [facility]. She does not note abdominal pain, yellowing of the skin/eyes, lower extremity edema, or hematemesis.      Allergies:  No Known Allergies      Hospital Medications:  aspirin enteric coated 81 milliGRAM(s) Oral daily  atorvastatin 40 milliGRAM(s) Oral at bedtime  dextrose 40% Gel 15 Gram(s) Oral once PRN  dextrose 5%. 1000 milliLiter(s) IV Continuous <Continuous>  dextrose 50% Injectable 12.5 Gram(s) IV Push once  dextrose 50% Injectable 25 Gram(s) IV Push once  dextrose 50% Injectable 25 Gram(s) IV Push once  enoxaparin Injectable 40 milliGRAM(s) SubCutaneous every 24 hours  glucagon  Injectable 1 milliGRAM(s) IntraMuscular once PRN  insulin lispro (HumaLOG) corrective regimen sliding scale   SubCutaneous three times a day before meals  insulin lispro (HumaLOG) corrective regimen sliding scale   SubCutaneous at bedtime  lactulose Syrup 20 Gram(s) Oral three times a day  nortriptyline 50 milliGRAM(s) Oral daily  pantoprazole    Tablet 40 milliGRAM(s) Oral before breakfast      PMHX/PSHX:  DM2 (diabetes mellitus, type 2)  CAD (coronary artery disease)  Mood disorder  Hypertension  Diabetes  History of kidney stones      Family history:  No pertinent family history in first degree relatives      Social History:     ROS:     General:  No wt loss, fevers, chills, night sweats, fatigue,   Eyes:  Good vision, no reported pain  ENT:  No sore throat, pain, runny nose, dysphagia  CV:  No pain, palpitations, hypo/hypertension  Resp:  No dyspnea, cough, tachypnea, wheezing  GI:  see HPI  :  No pain, bleeding, incontinence, nocturia  Muscle:  No pain, weakness  Neuro:  No weakness, tingling, memory problems  Psych:  No fatigue, insomnia, mood problems, depression  Endocrine:  No polyuria, polydipsia, cold/heat intolerance  Heme:  No petechiae, ecchymosis, easy bruisability  Skin:  No rash, tattoos, scars, edema      PHYSICAL EXAM:   Vital Signs:  Vital Signs Last 24 Hrs  T(C): 37.1 (08 Oct 2018 14:48), Max: 37.4 (07 Oct 2018 16:01)  T(F): 98.7 (08 Oct 2018 14:48), Max: 99.4 (07 Oct 2018 16:01)  HR: 91 (08 Oct 2018 14:48) (83 - 91)  BP: 147/85 (08 Oct 2018 14:48) (143/82 - 168/100)  BP(mean): --  RR: 18 (08 Oct 2018 14:48) (16 - 18)  SpO2: 100% (08 Oct 2018 14:48) (100% - 100%)  Daily     Daily     GENERAL:  no acute distress  HEENT:  -icterus   CHEST:  clear bilaterally, no wheezes or rales  HEART:  RRR, S1S2  ABDOMEN:  obese, soft, non-tender, non-distended  EXTEREMITIES:  no  edema  SKIN:  warm/dry  NEURO:  alert, oriented to person/place, -asterixis     LABS:                        12.5   7.56  )-----------( 169      ( 07 Oct 2018 17:30 )             37.7     10-07    139  |  98  |  7   ----------------------------<  94  3.2<L>   |  23  |  0.79    Ca    10.6<H>      07 Oct 2018 17:30    TPro  7.3  /  Alb  x   /  TBili  x   /  DBili  x   /  AST  x   /  ALT  x   /  AlkPhos  x   10-08    LIVER FUNCTIONS - ( 08 Oct 2018 01:30 )  Alb: x     / Pro: 7.3 g/dL / ALK PHOS: x     / ALT: x     / AST: x     / GGT: x           PT/INR - ( 07 Oct 2018 17:30 )   PT: 14.5 SEC;   INR: 1.26          PTT - ( 07 Oct 2018 17:30 )  PTT:33.6 SEC  Amylase Serum--      Lipase serum--       [id number]      Imaging:  < from: US Abdomen Complete (10.08.18 @ 10:48) >  FINDINGS:    Liver: Multiple ill-defined liver lesions.    Bile ducts: Normal caliber. Common bile duct measures 7 mm.     Gallbladder: Poorly visualized.        Pancreas: Visualized portions are within normal limits.    Spleen: 10 cm. Within normal limits.    Right kidney: 9 cm. No hydronephrosis.        Left kidney: 10 cm.  No hydronephrosis.        Ascites: None.    Aorta and IVC: Visualized portions are within normal limits.    IMPRESSION:     Multiple ill-defined liver lesions for which a contrast-enhanced MRI or   CT are recommended.    Gallbladder is poorly visualized.    < end of copied text > GASTROENTEROLOGY INITIAL CONSULT NOTE    Chief Complaint:  Patient is a 63y old  Female who presents with a chief complaint of AMS (08 Oct 2018 02:58)      HPI: 63y Female from [facility] with past medical history hypertension, diabetes type II, CAD who presented with altered mental status. Patient was noted on morning of presentation to be very confused, using her feet instead of her hands, attempting to eat her clothing, and drinking water straight from the sink. She was also noted by family to have unsteady gait, requiring assistance to walk despite normally being independent of all ADLs. Patient has no prior history of altered mental status. She denies trauma or recent falls. Hepatology consulted for liver lesions seen on abdominal ultrasound. Patient has never previously been told of any issues with her liver. She denies blood transfusions or tattoos. She is originally from [facility]. She does not note abdominal pain, yellowing of the skin/eyes, lower extremity edema, or hematemesis.      Allergies:  No Known Allergies      Hospital Medications:  aspirin enteric coated 81 milliGRAM(s) Oral daily  atorvastatin 40 milliGRAM(s) Oral at bedtime  dextrose 40% Gel 15 Gram(s) Oral once PRN  dextrose 5%. 1000 milliLiter(s) IV Continuous <Continuous>  dextrose 50% Injectable 12.5 Gram(s) IV Push once  dextrose 50% Injectable 25 Gram(s) IV Push once  dextrose 50% Injectable 25 Gram(s) IV Push once  enoxaparin Injectable 40 milliGRAM(s) SubCutaneous every 24 hours  glucagon  Injectable 1 milliGRAM(s) IntraMuscular once PRN  insulin lispro (HumaLOG) corrective regimen sliding scale   SubCutaneous three times a day before meals  insulin lispro (HumaLOG) corrective regimen sliding scale   SubCutaneous at bedtime  lactulose Syrup 20 Gram(s) Oral three times a day  nortriptyline 50 milliGRAM(s) Oral daily  pantoprazole    Tablet 40 milliGRAM(s) Oral before breakfast      PMHX/PSHX:  DM2 (diabetes mellitus, type 2)  CAD (coronary artery disease)  Mood disorder  Hypertension  Diabetes  History of kidney stones      Family history:  No pertinent family history in first degree relatives      Social History:     ROS:     General:  No wt loss, fevers, chills, night sweats, fatigue,   Eyes:  Good vision, no reported pain  ENT:  No sore throat, pain, runny nose, dysphagia  CV:  No pain, palpitations, hypo/hypertension  Resp:  No dyspnea, cough, tachypnea, wheezing  GI:  see HPI  :  No pain, bleeding, incontinence, nocturia  Muscle:  No pain, weakness  Neuro:  No weakness, tingling, memory problems  Psych:  No fatigue, insomnia, mood problems, depression  Endocrine:  No polyuria, polydipsia, cold/heat intolerance  Heme:  No petechiae, ecchymosis, easy bruisability  Skin:  No rash, tattoos, scars, edema      PHYSICAL EXAM:   Vital Signs:  Vital Signs Last 24 Hrs  T(C): 37.1 (08 Oct 2018 14:48), Max: 37.4 (07 Oct 2018 16:01)  T(F): 98.7 (08 Oct 2018 14:48), Max: 99.4 (07 Oct 2018 16:01)  HR: 91 (08 Oct 2018 14:48) (83 - 91)  BP: 147/85 (08 Oct 2018 14:48) (143/82 - 168/100)  BP(mean): --  RR: 18 (08 Oct 2018 14:48) (16 - 18)  SpO2: 100% (08 Oct 2018 14:48) (100% - 100%)  Daily     Daily     GENERAL:  no acute distress  HEENT:  -icterus   CHEST:  clear bilaterally, no wheezes or rales  HEART:  RRR, S1S2  ABDOMEN:  obese, soft, non-tender, non-distended  EXTEREMITIES:  no  edema  SKIN:  warm/dry  NEURO:  alert, oriented to person/place, -asterixis     LABS:                        12.5   7.56  )-----------( 169      ( 07 Oct 2018 17:30 )             37.7     10-07    139  |  98  |  7   ----------------------------<  94  3.2<L>   |  23  |  0.79    Ca    10.6<H>      07 Oct 2018 17:30    TPro  7.3  /  Alb  x   /  TBili  x   /  DBili  x   /  AST  x   /  ALT  x   /  AlkPhos  x   10-08    LIVER FUNCTIONS - ( 08 Oct 2018 01:30 )  Alb: x     / Pro: 7.3 g/dL / ALK PHOS: x     / ALT: x     / AST: x     / GGT: x           PT/INR - ( 07 Oct 2018 17:30 )   PT: 14.5 SEC;   INR: 1.26          PTT - ( 07 Oct 2018 17:30 )  PTT:33.6 SEC  Amylase Serum--      Lipase serum--       [id number]      Imaging:  < from: US Abdomen Complete (10.08.18 @ 10:48) >  FINDINGS:    Liver: Multiple ill-defined liver lesions.    Bile ducts: Normal caliber. Common bile duct measures 7 mm.     Gallbladder: Poorly visualized.        Pancreas: Visualized portions are within normal limits.    Spleen: 10 cm. Within normal limits.    Right kidney: 9 cm. No hydronephrosis.        Left kidney: 10 cm.  No hydronephrosis.        Ascites: None.    Aorta and IVC: Visualized portions are within normal limits.    IMPRESSION:     Multiple ill-defined liver lesions for which a contrast-enhanced MRI or   CT are recommended.    Gallbladder is poorly visualized.    < end of copied text >    < from: CT Abdomen and Pelvis w/ IV Cont (10.08.18 @ 20:26) >  FINDINGS:    LOWER CHEST: Within normal limits.    LIVER: Cirrhotic morphology. Multiple subcentimeter hypodense lesions,   too small to characterize.  BILE DUCTS: Normal caliber.  GALLBLADDER: Cholecystectomy.  SPLEEN: Splenomegaly.  PANCREAS: Within normal limits.  ADRENALS: Within normal limits.  KIDNEYS/URETERS: Symmetric enhancement without hydronephrosis.   Subcentimeter hypodense focus in the right upper pole, too small to   characterize.    BLADDER: Within normal limits.  REPRODUCTIVE ORGANS: The uterus and adnexa are within normal limits.    BOWEL: No bowel obstruction. Appendix normal.  PERITONEUM: No ascites.  VESSELS: Perisplenic varices and spontaneous splenorenal shunt. Portal,   splenic and superior mesenteric veins are patent. Patent hepatic veins.  RETROPERITONEUM: No lymphadenopathy.    ABDOMINAL WALL: Within normal limits.  BONES: Spinal degenerative changes.    IMPRESSION:  Hepatic cirrhosis.    Splenomegaly and perisplenic varices with spontaneous splenorenal shunt,   suggestive of portal hypertension.    Scattered subcentimeter hypodense liver lesions are too small to   characterize.    < end of copied text >

## 2018-10-08 NOTE — CONSULT NOTE ADULT - ATTENDING COMMENTS
PAtient seen and examined with neurology resident and above note reviewed and I agree with assessment and plan as outlined. Patient with altered mental status and change in behavior and confusion. She was found to have elevated ammonia and GGT and elevated calcium and TSH.   No focal findings on examination during my exam and daughter states she is overall doing better however not completely back to baseline functioning. CT head showed nothing acute and will proceed with medical management and supportive care.   No need for further neurologic workup unless an acute change in mentation.  All questions answered with daughter at bedside and she understood plan.
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  Await HBV testing to advise on antiviral therapy.  Consider MRI with contrast of liver to better define liver nodules.

## 2018-10-08 NOTE — H&P ADULT - PROBLEM SELECTOR PLAN 1
- DDx include metabolic (hypercalcemia, hypokalemia, elevated TSH), infection however no fever and WBC wnl, TIA,   - No history of liver disease.   - CT angiography brain: No acute pathology. No proximal large vessel occlusion.   - S/p 1.5L IVF in the ED   - Follow up UA, ammonia level  - Treat electrolyte abnormalities  - Fall precaution, PT consult - DDx include metabolic (hypercalcemia, hypokalemia, elevated TSH), infection however no fever and WBC wnl, TIA,   - No history of liver disease. B12 folate wnl. Toxicology negative   - CT angiography brain: No acute pathology. No proximal large vessel occlusion.   - S/p 1.5L IVF in the ED   - Follow up UA, ammonia level  - Treat electrolyte abnormalities  - Fall precaution, PT consult - DDx include metabolic (hypercalcemia, hypokalemia, elevated TSH), infection however no fever and WBC wnl, TIA,   - No history of liver disease. B12, folate wnl. Toxicology negative   - CT angiography brain: No acute pathology. No proximal large vessel occlusion.   - S/p 1.5L IVF in the ED   - Follow up UA, ammonia level  - Treat electrolyte abnormalities  - Fall precaution, PT consult - DDx include metabolic (hypercalcemia, hypokalemia, elevated TSH), hepatic encephalopathy, infection however no fever and WBC wnl, TIA,   - No history of liver disease. B12, folate wnl. Toxicology negative   - CT angiography brain: No acute pathology. No proximal large vessel occlusion.   - S/p 1.5L IVF in the ED   - Follow up UA, ammonia level  - Treat electrolyte abnormalities  - Fall precaution, PT consult

## 2018-10-08 NOTE — H&P ADULT - NSHPREVIEWOFSYSTEMS_GEN_ALL_CORE
CONSTITUTIONAL: No fevers or chills  EYES/ENT: No visual changes;  No  throat pain   NECK: No pain   RESPIRATORY: No cough, wheezing, hemoptysis; No shortness of breath  CARDIOVASCULAR: No chest pain or palpitations  GASTROINTESTINAL: No abdominal or epigastric pain. No nausea, vomiting, or hematemesis; No diarrhea or constipation. No melena or hematochezia.  GENITOURINARY: +dysuria  NEUROLOGICAL: No numbness or weakness  SKIN: No itching, rashes

## 2018-10-08 NOTE — CONSULT NOTE ADULT - ASSESSMENT
Patient is a 63F with PMHx DMT2, HTN, CAD, anemia presents from home with altered mental status x1 day. Symptoms began 10/7 in AM, per daughter the patient woke up as usual, and after using the restroom she started acting oddly, such as trying to drink water out of the sink, tried to lift her foot into the sink and was also seen biting on her clothes during breakfast mistaking it for an apple she held. She had an unsteady gait and required assistance to the dining table for breakfast. No recent illness or head trauma. Endorses dysuria few days ago. Denies fever/chills, chest pain, palpitations, chills, recent, headache, vision changes, cough, SOB at rest, abdominal pain, hematuria, rash, numbness, sick contacts, and recent travel.  She has no history of strokes, no recent medication changes or new foods  Neurology was consulted for AMS.   Neurologic exam is unremarkable. Noted to have elevated ammonia 106, K 3.2, mildly elevated Ca2+ (10.6), elevated alk phos, elevated TSH (4.29), elevated GGt. No asterixis. UA negative, B12, folate wnl, utox negative    Etiology likely metabolic encephalopathy, TIA less likely    Plan  [] correct electrolyte derangements  [] for completion of TIA workup: follow up TTE with bubble study  [] send free t4, initiate levothyroxine if consistent with hypothyroidism which may also explain sx.  [] workup for elevated ammonia, GGt, alphos, etc per primary team  [] follow up A1C  [] stat repeat CT head of mentation changes  [] resume home medications ( ASA, lipitor,)    Thank you for the consult  Will dw attending. Patient is a 63F with PMHx DMT2, HTN, CAD, anemia presents from home with altered mental status x1 day. Symptoms began 10/7 in AM, per daughter the patient woke up as usual, and after using the restroom she started acting oddly, such as trying to drink water out of the sink, tried to lift her foot into the sink and was also seen biting on her clothes during breakfast mistaking it for an apple she held. She had an unsteady gait and required assistance to the dining table for breakfast. No recent illness or head trauma. Endorses dysuria few days ago. Denies fever/chills, chest pain, palpitations, chills, recent, headache, vision changes, cough, SOB at rest, abdominal pain, hematuria, rash, numbness, sick contacts, and recent travel.  She has no history of strokes, no recent medication changes or new foods  Neurology was consulted for AMS.   Neurologic exam is unremarkable. Noted to have elevated ammonia 106, K 3.2, mildly elevated Ca2+ (10.6), elevated alk phos, elevated TSH (4.29), elevated GGt. No asterixis. UA negative, B12, folate wnl, utox negative    Etiology likely metabolic encephalopathy, TIA less likely    Plan  [] correct electrolyte derangements  [] for completion of TIA workup: follow up TTE with bubble study  [] send free t4, initiate levothyroxine if consistent with hypothyroidism which may also explain sx.  [] workup for elevated ammonia, GGt, alphos, etc per primary team  [] follow up A1C  [] stat repeat CT head of mentation changes  [] resume home medications ( ASA, lipitor,)  [] please obtain dental consult- patient states she is having tooth pain    Thank you for the consult  Will dw attending. Patient is a 63F with PMHx DMT2, HTN, CAD, anemia presents from home with altered mental status x1 day. Symptoms began 10/7 in AM, per daughter the patient woke up as usual, and after using the restroom she started acting oddly, such as trying to drink water out of the sink, tried to lift her foot into the sink and was also seen biting on her clothes during breakfast mistaking it for an apple she held. She had an unsteady gait and required assistance to the dining table for breakfast. No recent illness or head trauma. Endorses dysuria few days ago. Denies fever/chills, chest pain, palpitations, chills, recent, headache, vision changes, cough, SOB at rest, abdominal pain, hematuria, rash, numbness, sick contacts, and recent travel.  She has no history of strokes, no recent medication changes or new foods  Neurology was consulted for AMS.   Neurologic exam is unremarkable. Noted to have elevated ammonia 106, K 3.2, mildly elevated Ca2+ (10.6), elevated alk phos, elevated TSH (4.29), elevated GGt. No asterixis. UA negative, B12, folate wnl, utox negative    Etiology likely metabolic encephalopathy, TIA less likely    Plan  [] correct electrolyte derangements  [] for completion of TIA workup: follow up TTE with bubble study  [] send free t4, initiate levothyroxine if consistent with hypothyroidism which may also explain sx.  [] workup for elevated ammonia, GGt, alphos, etc per primary team  [] follow up A1C  [] stat repeat CT head if acute mentation change  [] resume home medications ( ASA, lipitor,)  [] please obtain dental consult- patient states she is having tooth pain    Thank you for the consult  Will dw attending.

## 2018-10-08 NOTE — H&P ADULT - PROBLEM SELECTOR PLAN 9
- Lovenox - Home antihypertensives: Losartan and HCTZ  - Will continue Losartan for now. Holding HCTZ due to hypokalemia

## 2018-10-08 NOTE — CONSULT NOTE ADULT - SUBJECTIVE AND OBJECTIVE BOX
HPI:  translation done by daughter at bedside.   Patient is a 63F with PMHx DMT2, HTN, CAD, anemia presents from home with altered mental status x1 day. Symptoms began 10/7 in AM, per daughter the patient woke up as usual, and after using the restroom she started acting oddly, such as trying to drink water out of the sink, tried to lift her foot into the sink and was also seen biting on her clothes during breakfast mistaking it for an apple she held. She had an unsteady gait and required assistance to the dining table for breakfast. No recent illness or head trauma. Endorses dysuria few days ago. Denies fever/chills, chest pain, palpitations, chills, recent, headache, vision changes, cough, SOB at rest, abdominal pain, hematuria, rash, numbness, sick contacts, and recent travel.  She has no history of strokes, no recent medication changes or new foods  She was found to be hypokalemic in the ED (3.2), ammonia 104, utox negative  Neurology was consulted for AMS.     MEDICATIONS  (STANDING):  aspirin enteric coated 81 milliGRAM(s) Oral daily  atorvastatin 40 milliGRAM(s) Oral at bedtime  dextrose 5%. 1000 milliLiter(s) (50 mL/Hr) IV Continuous <Continuous>  dextrose 50% Injectable 12.5 Gram(s) IV Push once  dextrose 50% Injectable 25 Gram(s) IV Push once  dextrose 50% Injectable 25 Gram(s) IV Push once  enoxaparin Injectable 40 milliGRAM(s) SubCutaneous every 24 hours  insulin lispro (HumaLOG) corrective regimen sliding scale   SubCutaneous three times a day before meals  nortriptyline 50 milliGRAM(s) Oral daily  pantoprazole    Tablet 40 milliGRAM(s) Oral before breakfast    MEDICATIONS  (PRN):  dextrose 40% Gel 15 Gram(s) Oral once PRN Blood Glucose LESS THAN 70 milliGRAM(s)/deciliter  glucagon  Injectable 1 milliGRAM(s) IntraMuscular once PRN Glucose LESS THAN 70 milligrams/deciliter    PAST MEDICAL & SURGICAL HISTORY:  DM2 (diabetes mellitus, type 2)  CAD (coronary artery disease)  Mood disorder  Hypertension  Diabetes  History of kidney stones    FAMILY HISTORY:  No pertinent family history in first degree relatives    Allergies  No Known Allergies  SHx - No smoking, No ETOH, No drug abuse    Review of Systems:  CONSTITUTIONAL:  No weight loss, fever, chills, weakness or fatigue.  HEENT:  Eyes:  No visual loss, blurred vision, double vision or yellow sclerae. Ears, Nose, Throat:  No hearing loss, sneezing, congestion, runny nose or sore throat.  CARDIOVASCULAR:  No chest pain, chest pressure or chest discomfort. No palpitations or edema.  GASTROINTESTINAL:  No anorexia, nausea, vomiting or diarrhea. No abdominal pain or blood.  NEUROLOGICAL: See HPI  MUSCULOSKELETAL:  No muscle, back pain, joint pain or stiffness.  PSYCHIATRIC:  No history of depression or anxiety.    Vital Signs Last 24 Hrs  T(C): 36.7 (07 Oct 2018 21:09), Max: 37.4 (07 Oct 2018 16:01)  T(F): 98.1 (07 Oct 2018 21:09), Max: 99.4 (07 Oct 2018 16:01)  HR: 89 (07 Oct 2018 16:01) (89 - 90)  BP: 143/82 (07 Oct 2018 21:09) (142/88 - 168/100)  BP(mean): --  RR: 17 (07 Oct 2018 21:09) (16 - 17)  SpO2: 100% (07 Oct 2018 21:09) (100% - 100%)    General Exam:   General appearance: No acute distress                 Neurological Exam:  Mental Status: Orientated to self, date and place.    No dysarthria, aphasia or neglect.      Cranial Nerves: CN I - not tested.  PERRL, EOMI, VFF, no nystagmus or diplopia.  No APD.    Fundi not visualized bilaterally.    No facial asymmetry.  Hearing intact to finger rub bilaterally.     Motor:   Tone: normal.                  Strength:     [] Upper extremity                      Delt       Bicep    Tricep                                                  R         5/5        5/5        5/5       5/5                                               L          5/5        5/5        5/5       5/5  [] Lower extremity                       HF          KE          KF        DF         PF                                               R        5/5        5/5        5/5       5/5       5/5                                               L         5/5        5/5       5/5       5/5        5/5  Pronator drift: none                 Dysmetria: BL NL FTN  No truncal ataxia.    Tremor: No resting, postural or action tremor.  No myoclonus. No asterixis    Sensation: intact to light touch    Deep Tendon Reflexes:   Right 1+ : BC, TC, BRD   Left 1+ : BC, TC, BRD  Right, left:  0 Knee, 0 ankle    Toes flexor bilaterally    Other:  Radiology  CT head, CTA head and neck: No proximal large vessel occlusion.

## 2018-10-08 NOTE — CONSULT NOTE ADULT - ASSESSMENT
Impression:  1) Altered mental status: differential includes hepatic encephalopathy, toxic metabolic, infectious, stroke. With mental status improving after lactulose administration, concern for hepatic encephalopathy.    2) Abnormal imaging: ultrasound showing multiple ill-defined liver lesions. Concern for HCC versus metastatic disease versus infection versus cysts.    3) ?Cirrhosis: patient with elevated INR, low platelets illustrating possible impaired hepatic synthetic function. Patient was admitted with AMS possibly due to hepatic encephalopathy with liver lesions which may be HCC in the setting of long-standing cirrhosis. Cirrhosis, if present, most likely from fatty liver versus viral hepatitis.      4) Metabolic syndrome: obesity, DM2    Recommendations:   - send viral hepatitis panel (HAV IgM/total, HBsAg, HBsAb, HBcAb total, HBcIgM), HCV  - send iron studies (ferritin, transferrin, iron, TIBC)   - obtain CT or MRI abdomen/pelvis with contrast for better evaluation of liver lesions (either modality acceptable)   - daily CBC, CMP, INR  - workup of altered mental status per primary/neurology Impression:  1) Altered mental status: differential includes hepatic encephalopathy, toxic metabolic, infectious, stroke. With mental status improving after lactulose administration, concern for hepatic encephalopathy.    2) Abnormal imaging: ultrasound showing multiple ill-defined liver lesions. Concern for HCC versus metastatic disease versus infection versus cysts.    3) ?Cirrhosis: patient with elevated INR, low platelets illustrating possible impaired hepatic synthetic function. Patient was admitted with AMS possibly due to hepatic encephalopathy with liver lesions which may be HCC in the setting of long-standing cirrhosis. Cirrhosis, if present, most likely from fatty liver versus viral hepatitis.      4) Metabolic syndrome: obesity, DM2    Recommendations:   - send viral hepatitis panel (HAV IgM/total, HBsAg, HBsAb, HBcAb total, HBcIgM), HCV  - send iron studies (ferritin, transferrin, iron, TIBC)   - obtain CT or MRI abdomen/pelvis with contrast for better evaluation of liver lesions (either modality acceptable)   - daily CBC, CMP, INR  - check AFP  - workup of altered mental status per primary/neurology Impression:  1) Altered mental status: differential includes hepatic encephalopathy, toxic metabolic, infectious, stroke. With mental status improving after lactulose administration, concern for hepatic encephalopathy.    2) Abnormal imaging: ultrasound showing multiple ill-defined liver lesions. Concern for HCC versus metastatic disease versus infection versus cysts.    3) Cirrhosis, likely 2/2 chronic HBV, Hep S Ag positive, MELD-NA 10  -Varices: Perisplenic varices seen on CT, no prior EGD in the past  -Ascites: None on CT  -PSE: Present before admission, resolved with lactulose  -Work up: None prior    Patient with elevated INR, low platelets illustrating possible impaired hepatic synthetic function. Patient was admitted with AMS possibly due to hepatic encephalopathy with liver lesions which may be HCC in the setting of long-standing cirrhosis. Iron studies nonrevealing.     4) Metabolic syndrome: obesity, DM2    Recommendations:   - Send Hep Be Ag and Ab, Hep B viral load, AFP  - Pt would benefit from HBV treatment given presence of cirrhosis  - obtain MRI abdomen/pelvis with contrast for better evaluation of liver lesions (either modality acceptable) as CT was not triple phase  - pt would benefit from EGD for varices surveillance, Hep A vaccine if non immune, and outpatient hepatology followup for HBV and HCC (if present) treatment  - family members advised to get Hep B status checked  - daily CBC, CMP, INR Impression:  1) Altered mental status: differential includes hepatic encephalopathy, toxic metabolic, infectious, stroke. With mental status improving after lactulose administration, concern for hepatic encephalopathy.    2) Abnormal imaging: ultrasound showing multiple ill-defined liver lesions. Concern for HCC versus metastatic disease versus infection versus cysts.    3) Cirrhosis, likely 2/2 chronic HBV, Hep S Ag positive, MELD-NA 10  -Varices: Perisplenic varices seen on CT, no prior EGD in the past  -Ascites: None on CT  -PSE: Present before admission, resolved with lactulose  -Work up: None prior    Patient with elevated INR, low platelets illustrating possible impaired hepatic synthetic function. Patient was admitted with AMS possibly due to hepatic encephalopathy with liver lesions which may be HCC in the setting of long-standing cirrhosis. Iron studies nonrevealing.     4) Metabolic syndrome: obesity, DM2    Recommendations:   - Send Hep Be Ag and Ab, Hep B viral load, AFP  - Pt would benefit from HBV treatment given presence of cirrhosis  - obtain MRI abdomen/pelvis with contrast for better evaluation of liver lesions as CT was not triple phase  - pt would benefit from EGD for varices surveillance, Hep A vaccine if non immune, and outpatient hepatology followup for HBV and HCC (if present) treatment  - family members advised to get Hep B status checked  - daily CBC, CMP, INR

## 2018-10-08 NOTE — OCCUPATIONAL THERAPY INITIAL EVALUATION ADULT - GENERAL OBSERVATIONS, REHAB EVAL
Pt received in semisupine position. Pt Sinhala speaking. Pt's son at bedside able to provide translation.

## 2018-10-08 NOTE — H&P ADULT - PMH
CAD (coronary artery disease)    Diabetes    DM2 (diabetes mellitus, type 2)    Hypertension    Mood disorder

## 2018-10-08 NOTE — H&P ADULT - NSHPLABSRESULTS_GEN_ALL_CORE
12.5   7.56  )-----------( 169      ( 07 Oct 2018 17:30 )             37.7     07 Oct 2018 17:30    139    |  98     |  7      ----------------------------<  94     3.2     |  23     |  0.79     Ca    10.6       07 Oct 2018 17:30    TPro  9.5    /  Alb  3.8    /  TBili  0.8    /  DBili  x      /  AST  50     /  ALT  28     /  AlkPhos  151    07 Oct 2018 17:30    LIVER FUNCTIONS - ( 07 Oct 2018 17:30 )  Alb: 3.8 g/dL / Pro: 9.5 g/dL / ALK PHOS: 151 u/L / ALT: 28 u/L / AST: 50 u/L / GGT: 57 u/L       PT/INR - ( 07 Oct 2018 17:30 )   PT: 14.5 SEC;   INR: 1.26          PTT - ( 07 Oct 2018 17:30 )  PTT:33.6 SEC  CAPILLARY BLOOD GLUCOSE      POCT Blood Glucose.: 202 mg/dL (07 Oct 2018 14:34)    < from: CT Angio Head w/ IV Cont (10.07.18 @ 20:33) >    FINDINGS:   NONCONTRAST CT BRAIN:  There is no CT evidence of acute intracranial hemorrhage, extra-axial   collection, vasogenic edema, mass effect, midline shift, central   herniation, hydrocephalus or transcortical infarct.     The ventricles and sulci are normal in size and configuration.     The visualized paranasal sinuses are clear.    The mastoid air cells and middle ear cavities are grossly clear.    The calvarium, skull base, visualized facial bones and regional soft   tissues are grossly unremarkable.    CT ANGIOGRAPHY NECK:  There is no evidence for significant stenosis or major vessel occlusion   involving the bilateral carotid arteries.    There is no evidence for significant stenosis or major vessel occlusion   involving the bilateral vertebral arteries.     1.1 cm left lower thyroid hypodense nodule. Additional tiny subcentimeter   nodules. The joint lung apices are clear. Ectasia of the visualized   proximal aortic arch aorta up to 3.9 cm. multilevel degenerative changes   of the visualized spine.    Visualized osseous structures are unremarkable.    CT ANGIOGRAPHY BRAIN:  There is no evidence for significant stenosis, major vessel occlusion, or   aneurysm about the [place].     There is no evidence for significant stenosis, major vessel occlusion, or   aneurysm involving the vertebrobasilar system.    No enlarged vascular lesions or clusters of abnormal vessels are noted to   suggest an arterial venous malformation within the field-of-view.    Visualized portions of the superficial and deep venous systems are   unremarkable.    IMPRESSION:     Noncontrast CT brain: No acute intracranial hemorrhage, mass effect, or   evidence of acute vascular territorial infarction.    If clinical symptoms persist or worsen, more sensitive evaluation with   brain MRI may be obtained, if no contraindications exist.    CT angiography neck: Known flow-limiting stenosis, occlusion, or evidence   of acute dissection within the cervical carotid or vertebral arteries.    1.1 cm left inferior thyroid lobe nodule. Further evaluation with   nonemergent ultrasound is recommended, not previously characterized.    Mild ectasia of the visualized proximal aortic arch.    CT angiography brain: No proximal large vessel occlusion.      < end of copied text >

## 2018-10-08 NOTE — H&P ADULT - ATTENDING COMMENTS
62 y/o female accompanied with her daughter, HX of HTN, DM, CAD, Iron Def. anemia, Diabetic Neuropathy, admitted for confusion, unsteady gait, R/O CVA, pt is A+O x 3 now as per daughter, back to baseline, No CP, NO SOB, No N/V, NO HA, + Dysuria, UA Neg., + Thyroid Nodule as per CT, ammonia 106, TOX screen Neg., Ca 10.6, Protein 9.5, high LFT, TSH 4.29, seen by Neuro as well, on TELE,     Fall/aspiration/seizure precaution, on ASA now, DVT Prophylaxis: SQ Lovenox, ECHO, on Lipitor now, FS, sliding scale, HgbA1c, SPEP, UPEP, serum and urine immunofixation, PT/OT consults, Hold Metformin, abdominal sono, Thyroid Sono, CBC, CMP, Fasting Lipid, hgbA1c, Hep A,B,C  profile, ECG, Neuro F/U, Permissive HTN x 24 HR, Speech and swallow consult, PO Lactulose  for High Ammonia,  Pt c/o tooth ache , consider dental consult,     Case D/W Pt, Pt's daughter, HS, TELE PA,     Pt was seen  by me , Dr. MILLIE Rees on 10/8/18.

## 2018-10-08 NOTE — PHYSICAL THERAPY INITIAL EVALUATION ADULT - ACTIVE RANGE OF MOTION EXAMINATION, REHAB EVAL
sonia. upper extremity Active ROM was WNL (within normal limits)/bilateral lower extremity Active ROM was WNL (within normal limits)

## 2018-10-08 NOTE — H&P ADULT - PROBLEM SELECTOR PLAN 6
- NIDDM2  - On Metformin at home  - Follow up A1c in the AM  - SSI - No overt sign of bleeding  - Follow up US abdomen  - Monitor

## 2018-10-09 LAB
ALBUMIN SERPL ELPH-MCNC: 3.1 G/DL — LOW (ref 3.3–5)
ALP SERPL-CCNC: 127 U/L — HIGH (ref 40–120)
ALT FLD-CCNC: 24 U/L — SIGNIFICANT CHANGE UP (ref 4–33)
APTT BLD: 37.7 SEC — HIGH (ref 27.5–37.4)
AST SERPL-CCNC: 37 U/L — HIGH (ref 4–32)
BILIRUB SERPL-MCNC: 0.8 MG/DL — SIGNIFICANT CHANGE UP (ref 0.2–1.2)
BUN SERPL-MCNC: 8 MG/DL — SIGNIFICANT CHANGE UP (ref 7–23)
CA-I BLD-SCNC: 1.18 MMOL/L — SIGNIFICANT CHANGE UP (ref 1.03–1.23)
CALCIUM SERPL-MCNC: 8.7 MG/DL — SIGNIFICANT CHANGE UP (ref 8.4–10.5)
CHLORIDE SERPL-SCNC: 102 MMOL/L — SIGNIFICANT CHANGE UP (ref 98–107)
CO2 SERPL-SCNC: 23 MMOL/L — SIGNIFICANT CHANGE UP (ref 22–31)
CREAT SERPL-MCNC: 0.79 MG/DL — SIGNIFICANT CHANGE UP (ref 0.5–1.3)
FERRITIN SERPL-MCNC: 47.67 NG/ML — SIGNIFICANT CHANGE UP (ref 15–150)
GLUCOSE SERPL-MCNC: 171 MG/DL — HIGH (ref 70–99)
HAV IGM SER-ACNC: NONREACTIVE — SIGNIFICANT CHANGE UP
HBA1C BLD-MCNC: 7.6 % — HIGH (ref 4–5.6)
HBV CORE IGM SER-ACNC: NONREACTIVE — SIGNIFICANT CHANGE UP
HBV SURFACE AG SER-ACNC: HIGH
HBV SURFACE AG SER-ACNC: REACTIVE — SIGNIFICANT CHANGE UP
HCT VFR BLD CALC: 32 % — LOW (ref 34.5–45)
HCV AB S/CO SERPL IA: 0.11 S/CO — SIGNIFICANT CHANGE UP
HCV AB SERPL-IMP: SIGNIFICANT CHANGE UP
HGB BLD-MCNC: 10.6 G/DL — LOW (ref 11.5–15.5)
INR BLD: 1.38 — HIGH (ref 0.88–1.17)
IRON SATN MFR SERPL: 269 UG/DL — SIGNIFICANT CHANGE UP (ref 140–530)
IRON SATN MFR SERPL: 70 UG/DL — SIGNIFICANT CHANGE UP (ref 30–160)
MAGNESIUM SERPL-MCNC: 1.4 MG/DL — LOW (ref 1.6–2.6)
MCHC RBC-ENTMCNC: 28.1 PG — SIGNIFICANT CHANGE UP (ref 27–34)
MCHC RBC-ENTMCNC: 33.1 % — SIGNIFICANT CHANGE UP (ref 32–36)
MCV RBC AUTO: 84.9 FL — SIGNIFICANT CHANGE UP (ref 80–100)
NRBC # FLD: 0 — SIGNIFICANT CHANGE UP
PHOSPHATE SERPL-MCNC: 3.7 MG/DL — SIGNIFICANT CHANGE UP (ref 2.5–4.5)
PLATELET # BLD AUTO: 142 K/UL — LOW (ref 150–400)
PMV BLD: 9.9 FL — SIGNIFICANT CHANGE UP (ref 7–13)
POTASSIUM SERPL-MCNC: 3.1 MMOL/L — LOW (ref 3.5–5.3)
POTASSIUM SERPL-SCNC: 3.1 MMOL/L — LOW (ref 3.5–5.3)
PROT SERPL-MCNC: 7.2 G/DL — SIGNIFICANT CHANGE UP (ref 6–8.3)
PROTHROM AB SERPL-ACNC: 15.4 SEC — HIGH (ref 9.8–13.1)
PTH-INTACT SERPL-MCNC: 34.63 PG/ML — SIGNIFICANT CHANGE UP (ref 15–65)
RBC # BLD: 3.77 M/UL — LOW (ref 3.8–5.2)
RBC # FLD: 14.6 % — HIGH (ref 10.3–14.5)
SODIUM SERPL-SCNC: 140 MMOL/L — SIGNIFICANT CHANGE UP (ref 135–145)
T4 FREE SERPL-MCNC: 1.33 NG/DL — SIGNIFICANT CHANGE UP (ref 0.9–1.8)
TRANSFERRIN SERPL-MCNC: 192 MG/DL — LOW (ref 200–360)
TSH SERPL-MCNC: 7.11 UIU/ML — HIGH (ref 0.27–4.2)
UIBC SERPL-MCNC: 198.6 UG/DL — SIGNIFICANT CHANGE UP (ref 110–370)
WBC # BLD: 5.43 K/UL — SIGNIFICANT CHANGE UP (ref 3.8–10.5)
WBC # FLD AUTO: 5.43 K/UL — SIGNIFICANT CHANGE UP (ref 3.8–10.5)

## 2018-10-09 PROCEDURE — 99222 1ST HOSP IP/OBS MODERATE 55: CPT | Mod: GC

## 2018-10-09 PROCEDURE — 99233 SBSQ HOSP IP/OBS HIGH 50: CPT

## 2018-10-09 RX ORDER — MAGNESIUM SULFATE 500 MG/ML
2 VIAL (ML) INJECTION ONCE
Qty: 0 | Refills: 0 | Status: COMPLETED | OUTPATIENT
Start: 2018-10-09 | End: 2018-10-09

## 2018-10-09 RX ORDER — METFORMIN HYDROCHLORIDE 850 MG/1
1 TABLET ORAL
Qty: 0 | Refills: 0 | COMMUNITY

## 2018-10-09 RX ORDER — POTASSIUM CHLORIDE 20 MEQ
10 PACKET (EA) ORAL
Qty: 0 | Refills: 0 | Status: COMPLETED | OUTPATIENT
Start: 2018-10-09 | End: 2018-10-09

## 2018-10-09 RX ORDER — NORTRIPTYLINE HYDROCHLORIDE 10 MG/1
1 CAPSULE ORAL
Qty: 0 | Refills: 0 | COMMUNITY

## 2018-10-09 RX ORDER — FOLIC ACID 0.8 MG
1 TABLET ORAL
Qty: 0 | Refills: 0 | COMMUNITY

## 2018-10-09 RX ORDER — POTASSIUM CHLORIDE 20 MEQ
40 PACKET (EA) ORAL EVERY 4 HOURS
Qty: 0 | Refills: 0 | Status: COMPLETED | OUTPATIENT
Start: 2018-10-09 | End: 2018-10-09

## 2018-10-09 RX ORDER — OMEPRAZOLE 10 MG/1
1 CAPSULE, DELAYED RELEASE ORAL
Qty: 0 | Refills: 0 | COMMUNITY

## 2018-10-09 RX ORDER — FERROUS SULFATE 325(65) MG
1 TABLET ORAL
Qty: 0 | Refills: 0 | COMMUNITY

## 2018-10-09 RX ORDER — ASPIRIN/CALCIUM CARB/MAGNESIUM 324 MG
1 TABLET ORAL
Qty: 0 | Refills: 0 | COMMUNITY

## 2018-10-09 RX ORDER — ACETAMINOPHEN 500 MG
650 TABLET ORAL ONCE
Qty: 0 | Refills: 0 | Status: COMPLETED | OUTPATIENT
Start: 2018-10-09 | End: 2018-10-09

## 2018-10-09 RX ADMIN — Medication 1: at 08:47

## 2018-10-09 RX ADMIN — LACTULOSE 20 GRAM(S): 10 SOLUTION ORAL at 14:48

## 2018-10-09 RX ADMIN — Medication 50 GRAM(S): at 10:46

## 2018-10-09 RX ADMIN — Medication 81 MILLIGRAM(S): at 12:38

## 2018-10-09 RX ADMIN — Medication 100 MILLIEQUIVALENT(S): at 14:48

## 2018-10-09 RX ADMIN — Medication 40 MILLIEQUIVALENT(S): at 18:02

## 2018-10-09 RX ADMIN — Medication 4: at 12:39

## 2018-10-09 RX ADMIN — Medication 40 MILLIEQUIVALENT(S): at 14:48

## 2018-10-09 RX ADMIN — LACTULOSE 20 GRAM(S): 10 SOLUTION ORAL at 21:59

## 2018-10-09 RX ADMIN — Medication 2: at 18:02

## 2018-10-09 RX ADMIN — Medication 650 MILLIGRAM(S): at 22:28

## 2018-10-09 RX ADMIN — ENOXAPARIN SODIUM 40 MILLIGRAM(S): 100 INJECTION SUBCUTANEOUS at 05:24

## 2018-10-09 RX ADMIN — ATORVASTATIN CALCIUM 40 MILLIGRAM(S): 80 TABLET, FILM COATED ORAL at 21:59

## 2018-10-09 RX ADMIN — Medication 100 MILLIEQUIVALENT(S): at 10:46

## 2018-10-09 RX ADMIN — PANTOPRAZOLE SODIUM 40 MILLIGRAM(S): 20 TABLET, DELAYED RELEASE ORAL at 05:24

## 2018-10-09 RX ADMIN — NORTRIPTYLINE HYDROCHLORIDE 50 MILLIGRAM(S): 10 CAPSULE ORAL at 14:51

## 2018-10-09 RX ADMIN — Medication 100 MILLIEQUIVALENT(S): at 14:03

## 2018-10-09 RX ADMIN — LACTULOSE 20 GRAM(S): 10 SOLUTION ORAL at 05:24

## 2018-10-09 RX ADMIN — Medication 650 MILLIGRAM(S): at 23:15

## 2018-10-09 NOTE — SWALLOW BEDSIDE ASSESSMENT ADULT - SWALLOW EVAL: FEEDING ASSISTANCE
I will START or STAY ON the medications listed below when I get home from the hospital:    anastrozole 1 mg oral tablet  -- 1 tab(s) by mouth once a day  -- Indication: For Breast cancer
set up only required

## 2018-10-09 NOTE — PROGRESS NOTE ADULT - PROBLEM SELECTOR PLAN 4
- NIDDM2  - On Metformin at home  - A1c 7.6  -monitor FS ISS  - SSI - NIDDM2  - On Metformin at home  - A1c 7.6  -monitor FS ISS

## 2018-10-09 NOTE — PROGRESS NOTE ADULT - ASSESSMENT
63F with PMH DMT2, HTN, CAD, anemia presents from home with altered mental status (tried to drink water out of the sink, tried to lift her foot into the sink and was also seen biting on her cloth during breakfast mistaking it for an apple she held). CtA h/N neg for CVA elevated NH4 noted US multiple ill define liver lesion. +HBsAg

## 2018-10-09 NOTE — PROGRESS NOTE ADULT - PROBLEM SELECTOR PLAN 1
-likely due to hepatic encephalopathy HBsAg + NH4 106 GGT elevated with mildly elevated INR 1.2  -check Hep B PCR  -monitor LFTs  - f/u CT A/P   - No history of liver disease lived in [facility] 1 yr returned early 2018.  - B12, folate wnl. Toxicology negative  -titrate lactulose to 2-3 BM/day   - hepatology/GI follow up -likely due to hepatic encephalopathy HBsAg + NH4 106 GGT elevated with mildly elevated INR 1.2  -check Hep B PCR/AFP  -monitor LFTs  - f/u CT A/P   - No history of liver disease lived in [facility] 1 yr returned early 2018.  - B12, folate wnl. Toxicology negative  -titrate lactulose to 2-3 BM/day   - hepatology/GI follow up

## 2018-10-09 NOTE — SWALLOW BEDSIDE ASSESSMENT ADULT - SWALLOW EVAL: RECOMMENDED FEEDING/EATING TECHNIQUES
small sips/bites/allow for swallow between intakes/alternate food with liquid/position upright (90 degrees)/crush medication (when feasible)/maintain upright posture during/after eating for 30 mins/oral hygiene

## 2018-10-09 NOTE — PROGRESS NOTE ADULT - PROBLEM SELECTOR PLAN 7
- Home antihypertensives: Losartan and HCTZ  - Will continue Losartan for now. Holding HCTZ due to hypokalemia - Home antihypertensives: Losartan and HCTZ  - Will continue Losartan for now. Holding HCTZ due to hypokalemia  -monitor BP

## 2018-10-09 NOTE — PROGRESS NOTE ADULT - PROBLEM SELECTOR PLAN 2
- Given elevated protein gap and history of anemia. Suspicion for multiple myeloma   - S/p 1.5L IVF in the ED  - ionized Ca-WNL  - hypercalcemia resolved with IVF check PTH, vit D 1,25 and 25 PTHrp, phos, SPEP/UPEP  - IVF PRN - Given elevated protein gap and history of anemia. Suspicion for multiple myeloma   - S/p 1.5L IVF in the ED  - ionized Ca-WNL  - hypercalcemia resolved with IVF check PTH, vit D 1,25 and 25 PTHrp, phos, SPEP/UPEP/IF  - IVF PRN

## 2018-10-09 NOTE — PROGRESS NOTE ADULT - SUBJECTIVE AND OBJECTIVE BOX
Patient is a 63y old  Female who presents with a chief complaint of AMS, R/O CVA, (08 Oct 2018 15:36)      SUBJECTIVE / OVERNIGHT EVENTS: Pt had 2 BM's yesterday family at bedside MS improved denies     MEDICATIONS  (STANDING):  aspirin enteric coated 81 milliGRAM(s) Oral daily  atorvastatin 40 milliGRAM(s) Oral at bedtime  dextrose 5%. 1000 milliLiter(s) (50 mL/Hr) IV Continuous <Continuous>  dextrose 50% Injectable 12.5 Gram(s) IV Push once  dextrose 50% Injectable 25 Gram(s) IV Push once  dextrose 50% Injectable 25 Gram(s) IV Push once  enoxaparin Injectable 40 milliGRAM(s) SubCutaneous every 24 hours  insulin lispro (HumaLOG) corrective regimen sliding scale   SubCutaneous three times a day before meals  insulin lispro (HumaLOG) corrective regimen sliding scale   SubCutaneous at bedtime  lactulose Syrup 20 Gram(s) Oral three times a day  nortriptyline 50 milliGRAM(s) Oral daily  pantoprazole    Tablet 40 milliGRAM(s) Oral before breakfast  potassium chloride    Tablet ER 40 milliEquivalent(s) Oral every 4 hours  potassium chloride  10 mEq/100 mL IVPB 10 milliEquivalent(s) IV Intermittent every 1 hour    MEDICATIONS  (PRN):  dextrose 40% Gel 15 Gram(s) Oral once PRN Blood Glucose LESS THAN 70 milliGRAM(s)/deciliter  glucagon  Injectable 1 milliGRAM(s) IntraMuscular once PRN Glucose LESS THAN 70 milligrams/deciliter        CAPILLARY BLOOD GLUCOSE      POCT Blood Glucose.: 170 mg/dL (09 Oct 2018 08:41)  POCT Blood Glucose.: 159 mg/dL (08 Oct 2018 22:12)  POCT Blood Glucose.: 201 mg/dL (08 Oct 2018 17:09)  POCT Blood Glucose.: 154 mg/dL (08 Oct 2018 13:22)    I&O's Summary      T(C): 36.4 (10-09-18 @ 05:21), Max: 37.1 (10-08-18 @ 14:48)  HR: 74 (10-09-18 @ 05:21) (74 - 92)  BP: 140/89 (10-09-18 @ 05:21) (131/84 - 156/96)  RR: 18 (10-09-18 @ 05:21) (16 - 18)  SpO2: 99% (10-09-18 @ 05:21) (99% - 100%)    PHYSICAL EXAM:  GENERAL: NAD, well-developed  HEAD:  Atraumatic, Normocephalic  EYES: EOMI, PERRLA, conjunctiva and sclera clear  NECK: Supple, No JVD  CHEST/LUNG: Clear to auscultation bilaterally; No wheeze  HEART: s1/s2  ABDOMEN: Soft, Nontender, Nondistended; Bowel sounds present  EXTREMITIES:  no edema  PSYCH: AAOx3  NEUROLOGY: non-focal  SKIN: No rashes or lesions    LABS:                        10.6   5.43  )-----------( 142      ( 09 Oct 2018 07:30 )             32.0     10    140  |  102  |  8   ----------------------------<  171<H>  3.1<L>   |  23  |  0.79    Ca    8.7      09 Oct 2018 07:30  Phos  3.7     10-09  Mg     1.4     10-09    TPro  7.2  /  Alb  3.1<L>  /  TBili  0.8  /  DBili  x   /  AST  37<H>  /  ALT  24  /  AlkPhos  127<H>  10    PT/INR - ( 09 Oct 2018 07:30 )   PT: 15.4 SEC;   INR: 1.38          PTT - ( 09 Oct 2018 07:30 )  PTT:37.7 SEC      Urinalysis Basic - ( 07 Oct 2018 23:45 )    Color: YELLOW / Appearance: CLEAR / S.047 / pH: 8.0  Gluc: NEGATIVE / Ketone: NEGATIVE  / Bili: NEGATIVE / Urobili: NORMAL   Blood: NEGATIVE / Protein: NEGATIVE / Nitrite: NEGATIVE   Leuk Esterase: NEGATIVE / RBC: x / WBC x   Sq Epi: x / Non Sq Epi: x / Bacteria: x          RADIOLOGY & ADDITIONAL TESTS:    Imaging Personally Reviewed:< from: US Abdomen Complete (10.08.18 @ 10:48) >  IMPRESSION:     Multiple ill-defined liver lesions for which a contrast-enhanced MRI or   CT are recommended.    Gallbladder is poorly visualized.    < end of copied text >  < from: US Thyroid + Parathyroid (10.08.18 @ 10:48) >  IMPRESSION:     A 1 cm hypoechoic left lower pole nodule; consider biopsy or a 6 month   follow-up ultrasound.    < end of copied text >      Consultant(s) Notes Reviewed:      Care Discussed with Consultants/Other Providers: Patient is a 63y old  Female who presents with a chief complaint of AMS, R/O CVA, (08 Oct 2018 15:36)      SUBJECTIVE / OVERNIGHT EVENTS: Pt had 2 BM's yesterday family at bedside MS improved denies N/V/SOB    MEDICATIONS  (STANDING):  aspirin enteric coated 81 milliGRAM(s) Oral daily  atorvastatin 40 milliGRAM(s) Oral at bedtime  dextrose 5%. 1000 milliLiter(s) (50 mL/Hr) IV Continuous <Continuous>  dextrose 50% Injectable 12.5 Gram(s) IV Push once  dextrose 50% Injectable 25 Gram(s) IV Push once  dextrose 50% Injectable 25 Gram(s) IV Push once  enoxaparin Injectable 40 milliGRAM(s) SubCutaneous every 24 hours  insulin lispro (HumaLOG) corrective regimen sliding scale   SubCutaneous three times a day before meals  insulin lispro (HumaLOG) corrective regimen sliding scale   SubCutaneous at bedtime  lactulose Syrup 20 Gram(s) Oral three times a day  nortriptyline 50 milliGRAM(s) Oral daily  pantoprazole    Tablet 40 milliGRAM(s) Oral before breakfast  potassium chloride    Tablet ER 40 milliEquivalent(s) Oral every 4 hours  potassium chloride  10 mEq/100 mL IVPB 10 milliEquivalent(s) IV Intermittent every 1 hour    MEDICATIONS  (PRN):  dextrose 40% Gel 15 Gram(s) Oral once PRN Blood Glucose LESS THAN 70 milliGRAM(s)/deciliter  glucagon  Injectable 1 milliGRAM(s) IntraMuscular once PRN Glucose LESS THAN 70 milligrams/deciliter        CAPILLARY BLOOD GLUCOSE      POCT Blood Glucose.: 170 mg/dL (09 Oct 2018 08:41)  POCT Blood Glucose.: 159 mg/dL (08 Oct 2018 22:12)  POCT Blood Glucose.: 201 mg/dL (08 Oct 2018 17:09)  POCT Blood Glucose.: 154 mg/dL (08 Oct 2018 13:22)    I&O's Summary      T(C): 36.4 (10-09-18 @ 05:21), Max: 37.1 (10-08-18 @ 14:48)  HR: 74 (10-09-18 @ 05:21) (74 - 92)  BP: 140/89 (10-09-18 @ 05:21) (131/84 - 156/96)  RR: 18 (10-09-18 @ 05:21) (16 - 18)  SpO2: 99% (10-09-18 @ 05:21) (99% - 100%)    PHYSICAL EXAM:  GENERAL: NAD, well-developed  HEAD:  Atraumatic, Normocephalic  EYES: EOMI, PERRLA, conjunctiva and sclera clear  NECK: Supple, No JVD  CHEST/LUNG: Clear to auscultation bilaterally; No wheeze  HEART: s1/s2  ABDOMEN: Soft, Nontender, Nondistended; Bowel sounds present  EXTREMITIES:  no edema  PSYCH: AAOx3  NEUROLOGY: non-focal  SKIN: No rashes or lesions    LABS:                        10.6   5.43  )-----------( 142      ( 09 Oct 2018 07:30 )             32.0     10-09    140  |  102  |  8   ----------------------------<  171<H>  3.1<L>   |  23  |  0.79    Ca    8.7      09 Oct 2018 07:30  Phos  3.7     10-09  Mg     1.4     10-09    TPro  7.2  /  Alb  3.1<L>  /  TBili  0.8  /  DBili  x   /  AST  37<H>  /  ALT  24  /  AlkPhos  127<H>  10-09    PT/INR - ( 09 Oct 2018 07:30 )   PT: 15.4 SEC;   INR: 1.38          PTT - ( 09 Oct 2018 07:30 )  PTT:37.7 SEC      Urinalysis Basic - ( 07 Oct 2018 23:45 )    Color: YELLOW / Appearance: CLEAR / S.047 / pH: 8.0  Gluc: NEGATIVE / Ketone: NEGATIVE  / Bili: NEGATIVE / Urobili: NORMAL   Blood: NEGATIVE / Protein: NEGATIVE / Nitrite: NEGATIVE   Leuk Esterase: NEGATIVE / RBC: x / WBC x   Sq Epi: x / Non Sq Epi: x / Bacteria: x          RADIOLOGY & ADDITIONAL TESTS:    Imaging Personally Reviewed:< from: US Abdomen Complete (10.08.18 @ 10:48) >  IMPRESSION:     Multiple ill-defined liver lesions for which a contrast-enhanced MRI or   CT are recommended.    Gallbladder is poorly visualized.    < end of copied text >  < from: US Thyroid + Parathyroid (10.08.18 @ 10:48) >  IMPRESSION:     A 1 cm hypoechoic left lower pole nodule; consider biopsy or a 6 month   follow-up ultrasound.    < end of copied text >      Consultant(s) Notes Reviewed:      Care Discussed with Consultants/Other Providers:

## 2018-10-09 NOTE — SWALLOW BEDSIDE ASSESSMENT ADULT - COMMENTS
The patient was seen this AM for an initial assessment of swallow function, at which time she was alert and cooperative. The patient's daughter was present at bedside throughout this evaluation and served as a reliable informant and , as the patient's primary speaking language is [place]. Per charting, the patient is a 62 y/o F with PMHx significant of DMT2, HTN, CAD, and anemia. She was admitted for AMS. Recent CXR revealed the lungs are clear. In addition, recent CT of the head revealed, "This exam is somewhat limited by motion though grossly unremarkable   noncontrast head CT." Discussed results and recommendations from this evaluation with the patient, patient's daughter, RN, and call out to MD. The patient was seen this AM for an initial assessment of swallow function, at which time she was alert and cooperative. The patient's daughter was present at bedside throughout this evaluation and served as a reliable informant and , as the patient's primary speaking language is [place]. Per charting, the patient is a 64 y/o F with PMHx significant of DMT2, HTN, CAD, and anemia. She was admitted for AMS. Recent CXR revealed the lungs are clear. In addition, recent CT of the head revealed, "This exam is somewhat limited by motion though grossly unremarkable noncontrast head CT." Discussed results and recommendations from this evaluation with the patient, patient's daughter, RN, and call out to MD.

## 2018-10-09 NOTE — SWALLOW BEDSIDE ASSESSMENT ADULT - ASR SWALLOW ASPIRATION MONITOR
fever/pneumonia/throat clearing/oral hygiene/cough/upper respiratory infection/gurgly voice/change of breathing pattern/position upright (89Y)

## 2018-10-10 LAB
24R-OH-CALCIDIOL SERPL-MCNC: 27.7 NG/ML — LOW (ref 30–80)
AFP-TM SERPL-MCNC: 7.5 NG/ML — SIGNIFICANT CHANGE UP
AFP-TM SERPL-MCNC: 8 NG/ML — SIGNIFICANT CHANGE UP
AMMONIA BLD-MCNC: 81 UMOL/L — HIGH (ref 11–55)
BUN SERPL-MCNC: 6 MG/DL — LOW (ref 7–23)
CALCIUM SERPL-MCNC: 8.6 MG/DL — SIGNIFICANT CHANGE UP (ref 8.4–10.5)
CHLORIDE SERPL-SCNC: 106 MMOL/L — SIGNIFICANT CHANGE UP (ref 98–107)
CO2 SERPL-SCNC: 20 MMOL/L — LOW (ref 22–31)
CREAT SERPL-MCNC: 0.76 MG/DL — SIGNIFICANT CHANGE UP (ref 0.5–1.3)
GLUCOSE SERPL-MCNC: 188 MG/DL — HIGH (ref 70–99)
HBV CORE AB SER-ACNC: REACTIVE — SIGNIFICANT CHANGE UP
HBV DNA # SERPL NAA+PROBE: SIGNIFICANT CHANGE UP IU/ML
HBV DNA SERPL NAA+PROBE-LOG#: 5.68 LOGIU/ML — SIGNIFICANT CHANGE UP
HBV E AG SER-ACNC: NEGATIVE — SIGNIFICANT CHANGE UP
HBV SURFACE AG SER-ACNC: REACTIVE — SIGNIFICANT CHANGE UP
HCT VFR BLD CALC: 32 % — LOW (ref 34.5–45)
HGB BLD-MCNC: 10.7 G/DL — LOW (ref 11.5–15.5)
MAGNESIUM SERPL-MCNC: 1.8 MG/DL — SIGNIFICANT CHANGE UP (ref 1.6–2.6)
MCHC RBC-ENTMCNC: 29 PG — SIGNIFICANT CHANGE UP (ref 27–34)
MCHC RBC-ENTMCNC: 33.4 % — SIGNIFICANT CHANGE UP (ref 32–36)
MCV RBC AUTO: 86.7 FL — SIGNIFICANT CHANGE UP (ref 80–100)
NRBC # FLD: 0 — SIGNIFICANT CHANGE UP
PLATELET # BLD AUTO: 133 K/UL — LOW (ref 150–400)
PMV BLD: 10.3 FL — SIGNIFICANT CHANGE UP (ref 7–13)
POTASSIUM SERPL-MCNC: 3.8 MMOL/L — SIGNIFICANT CHANGE UP (ref 3.5–5.3)
POTASSIUM SERPL-SCNC: 3.8 MMOL/L — SIGNIFICANT CHANGE UP (ref 3.5–5.3)
RBC # BLD: 3.69 M/UL — LOW (ref 3.8–5.2)
RBC # FLD: 14.6 % — HIGH (ref 10.3–14.5)
SODIUM SERPL-SCNC: 137 MMOL/L — SIGNIFICANT CHANGE UP (ref 135–145)
VIT D25+D1,25 OH+D1,25 PNL SERPL-MCNC: 54.2 PG/ML — SIGNIFICANT CHANGE UP (ref 19.9–79.3)
WBC # BLD: 5.56 K/UL — SIGNIFICANT CHANGE UP (ref 3.8–10.5)
WBC # FLD AUTO: 5.56 K/UL — SIGNIFICANT CHANGE UP (ref 3.8–10.5)

## 2018-10-10 PROCEDURE — 99232 SBSQ HOSP IP/OBS MODERATE 35: CPT | Mod: GC

## 2018-10-10 PROCEDURE — 99233 SBSQ HOSP IP/OBS HIGH 50: CPT

## 2018-10-10 RX ORDER — CHOLECALCIFEROL (VITAMIN D3) 125 MCG
1000 CAPSULE ORAL DAILY
Qty: 0 | Refills: 0 | Status: DISCONTINUED | OUTPATIENT
Start: 2018-10-10 | End: 2018-10-12

## 2018-10-10 RX ADMIN — Medication 1000 UNIT(S): at 13:07

## 2018-10-10 RX ADMIN — PANTOPRAZOLE SODIUM 40 MILLIGRAM(S): 20 TABLET, DELAYED RELEASE ORAL at 05:11

## 2018-10-10 RX ADMIN — NORTRIPTYLINE HYDROCHLORIDE 50 MILLIGRAM(S): 10 CAPSULE ORAL at 11:38

## 2018-10-10 RX ADMIN — LACTULOSE 20 GRAM(S): 10 SOLUTION ORAL at 21:28

## 2018-10-10 RX ADMIN — Medication 1: at 21:52

## 2018-10-10 RX ADMIN — Medication 3: at 13:07

## 2018-10-10 RX ADMIN — ENOXAPARIN SODIUM 40 MILLIGRAM(S): 100 INJECTION SUBCUTANEOUS at 05:11

## 2018-10-10 RX ADMIN — LACTULOSE 20 GRAM(S): 10 SOLUTION ORAL at 05:11

## 2018-10-10 RX ADMIN — ATORVASTATIN CALCIUM 40 MILLIGRAM(S): 80 TABLET, FILM COATED ORAL at 21:29

## 2018-10-10 RX ADMIN — Medication 81 MILLIGRAM(S): at 11:38

## 2018-10-10 RX ADMIN — LACTULOSE 20 GRAM(S): 10 SOLUTION ORAL at 13:07

## 2018-10-10 RX ADMIN — Medication 2: at 17:53

## 2018-10-10 NOTE — PROGRESS NOTE ADULT - ATTENDING COMMENTS
Patient with mental status at baseline according to family.  Has HBV and await HBV test results and abdominal MRI.  Patient is to establish follow up care for HBV to allow initiation of antiviral therapy.

## 2018-10-10 NOTE — PROGRESS NOTE ADULT - PROBLEM SELECTOR PLAN 1
-likely due to hepatic encephalopathy HBsAg + NH4 106 GGT elevated with mildly elevated INR 1.2  - No history of liver disease lived in [facility] 1 yr returned early 2018.  -check Hep B PCR-P  -AFP-neg   -monitor LFTs  - CT A/P-multiple lesion too small to characterize cirrhosis with splenomegaly and findings suggestive of portal hypertension .   - B12, folate wnl. Toxicology negative  -titrate lactulose to 2-3 BM/day   - hepatology/GI follow up--MRI abdomen with contrast

## 2018-10-10 NOTE — PROGRESS NOTE ADULT - SUBJECTIVE AND OBJECTIVE BOX
INTERVAL HPI/OVERNIGHT EVENTS:    63y Female from [facility] with past medical history hypertension, diabetes type II, CAD who presented with altered mental status. Patient was noted on morning of presentation to be very confused, using her feet instead of her hands, attempting to eat her clothing, and drinking water straight from the sink. She was also noted by family to have unsteady gait, requiring assistance to walk despite normally being independent of all ADLs. Patient has no prior history of altered mental status. She denies trauma or recent falls. Hepatology consulted for liver lesions seen on abdominal ultrasound. Patient has never previously been told of any issues with her liver. She denies blood transfusions or tattoos. She is originally from [facility]. She does not note abdominal pain, yellowing of the skin/eyes, lower extremity edema, or hematemesis.      10/10 patient has no complaints, family mentions that patient is at her baseline mentation, usually not oriented to time at baseline as per them     PAST MEDICAL & SURGICAL HISTORY:  DM2 (diabetes mellitus, type 2)  CAD (coronary artery disease)  Mood disorder  Hypertension  Diabetes  History of kidney stones      Home Medications:  aspirin 81 mg oral delayed release tablet: 1 tab(s) orally once a day (09 Oct 2018 11:35)  Calcium 600+D oral tablet: 1 tab(s) orally 2 times a day (09 Oct 2018 11:35)  ferrous sulfate 325 mg (65 mg elemental iron) oral tablet: 1 tab(s) orally 2 times a day (09 Oct 2018 11:35)  folic acid 1 mg oral tablet: 1 tab(s) orally once a day (09 Oct 2018 11:35)  losartan-hydrochlorothiazide 100mg-12.5mg oral tablet: 1 tab(s) orally once a day (09 Oct 2018 11:35)  metFORMIN 1000 mg oral tablet: 1 tab(s) orally 2 times a day (09 Oct 2018 11:35)  nortriptyline 50 mg oral capsule: 1 tab(s) orally once a day (09 Oct 2018 11:35)  omeprazole 40 mg oral delayed release capsule: 1 cap(s) orally once a day (09 Oct 2018 11:35)      MEDICATIONS  (STANDING):  aspirin enteric coated 81 milliGRAM(s) Oral daily  atorvastatin 40 milliGRAM(s) Oral at bedtime  cholecalciferol 1000 Unit(s) Oral daily  dextrose 5%. 1000 milliLiter(s) (50 mL/Hr) IV Continuous <Continuous>  dextrose 50% Injectable 12.5 Gram(s) IV Push once  dextrose 50% Injectable 25 Gram(s) IV Push once  dextrose 50% Injectable 25 Gram(s) IV Push once  enoxaparin Injectable 40 milliGRAM(s) SubCutaneous every 24 hours  insulin lispro (HumaLOG) corrective regimen sliding scale   SubCutaneous three times a day before meals  insulin lispro (HumaLOG) corrective regimen sliding scale   SubCutaneous at bedtime  lactulose Syrup 20 Gram(s) Oral three times a day  nortriptyline 50 milliGRAM(s) Oral daily  pantoprazole    Tablet 40 milliGRAM(s) Oral before breakfast    MEDICATIONS  (PRN):  dextrose 40% Gel 15 Gram(s) Oral once PRN Blood Glucose LESS THAN 70 milliGRAM(s)/deciliter  glucagon  Injectable 1 milliGRAM(s) IntraMuscular once PRN Glucose LESS THAN 70 milligrams/deciliter      Allergies    No Known Allergies    Intolerances        Review of systems  General: mild fatigue   Skin: no rash   Ophtalmologic: no visual changes   Respiratory: no shortness of breath   Cardiovascular: no chest pain   Gastrointestinal: as per H&P   Genitourinary: no dysuria   Neurological: no weakness   otherwise as described above     PHYSICAL EXAM:   Vital Signs:  Vital Signs Last 24 Hrs  T(C): 36.4 (10 Oct 2018 14:31), Max: 36.8 (10 Oct 2018 00:15)  T(F): 97.6 (10 Oct 2018 14:31), Max: 98.2 (10 Oct 2018 00:15)  HR: 80 (10 Oct 2018 14:31) (73 - 89)  BP: 120/89 (10 Oct 2018 14:31) (120/89 - 153/99)  BP(mean): --  RR: 18 (10 Oct 2018 14:31) (18 - 18)  SpO2: 100% (10 Oct 2018 14:31) (97% - 100%)  Daily     Daily     GENERAL:  no distress  SKIN: intact  HEENT:  NC/AT,  anicteric  CHEST:   no increased effort, breath sounds clear  HEART:  Regular rhythm  ABDOMEN:  Soft, non-tender, non-distended, normoactive bowel sounds  EXTEREMITIES:  no cyanosis  PSYCHIATRIC: normal affect, oriented to place and person but not to time       LABS:                        10.7   5.56  )-----------( 133      ( 10 Oct 2018 07:20 )             32.0       Hemoglobin: 10.7 g/dL (10-10-18 @ 07:20)  Hemoglobin: 10.6 g/dL (10-09-18 @ 07:30)  Hemoglobin: 12.5 g/dL (10-07-18 @ 17:30)      10-10    137  |  106  |  6<L>  ----------------------------<  188<H>  3.8   |  20<L>  |  0.76    Ca    8.6      10 Oct 2018 07:20  Phos  3.7     10-09  Mg     1.8     10-10    TPro  7.2  /  Alb  3.1<L>  /  TBili  0.8  /  DBili  x   /  AST  37<H>  /  ALT  24  /  AlkPhos  127<H>  10-09      INR: 1.38 (10-09-18 @ 07:30)  INR: 1.26 (10-07-18 @ 17:30)      Alkaline Phosphatase, Serum: 127 u/L (10-09-18 @ 07:30)  Aspartate Aminotransferase (AST/SGOT): 37 u/L (10-09-18 @ 07:30)  Alanine Aminotransferase (ALT/SGPT): 24 u/L (10-09-18 @ 07:30)  Aspartate Aminotransferase (AST/SGOT): 50 u/L (10-07-18 @ 17:30)  Alanine Aminotransferase (ALT/SGPT): 28 u/L (10-07-18 @ 17:30)  Alkaline Phosphatase, Serum: 151 u/L (10-07-18 @ 17:30)        Ammonia, Serum: 81 umol/L <H> [11 - 55] (10-10-18 @ 07:20)      RADIOLOGY & ADDITIONAL TESTS:

## 2018-10-10 NOTE — PROGRESS NOTE ADULT - PROBLEM SELECTOR PLAN 7
- Home antihypertensives: Losartan and HCTZ  - Will continue Losartan for now. Holding HCTZ due to hypokalemia  -monitor BP - Home antihypertensives: Losartan and HCTZ  - Will continue Losartan for now. Holding HCTZ due to hypokalemia  -monitor BP (140-150)

## 2018-10-10 NOTE — PROGRESS NOTE ADULT - ASSESSMENT
63y Female from [facility] with past medical history hypertension, diabetes type II, CAD who presented with altered mental status. Patient was noted on morning of presentation to be very confused, using her feet instead of her hands, attempting to eat her clothing, and drinking water straight from the sink. She was also noted by family to have unsteady gait, requiring assistance to walk despite normally being independent of all ADLs. Patient has no prior history of altered mental status. She denies trauma or recent falls. Hepatology consulted for liver lesions seen on abdominal ultrasound. Patient has never previously been told of any issues with her liver. She denies blood transfusions or tattoos. She is originally from [facility]. She does not note abdominal pain, yellowing of the skin/eyes, lower extremity edema, or hematemesis.      1) Altered mental status  resolved    2) Abnormal imaging:   ultrasound showing multiple ill-defined liver lesions. Concern for HCC versus metastatic disease versus infection versus cysts.    check MRI liver protocol with ic     3) Cirrhosis on ct,   likely 2/2 chronic HBV, HBs Ag positive, MELD-NA 10  -Varices: Perisplenic varices seen on CT, no prior EGD in the past  -Ascites: None on CT  -PSE:  unsure of cause of altered mentation  normal afp, iron studies   HCV neg,      check Hbe ag and ab, HBV PCR,  check HAV IgG, ximena, ama, asma, ceruloplasmin, alpha 1 antitrypsin  daily LFTs and INR   i/o, low Na diet   needs us abd and afp q 6 months   o/p egd for EV screening   advised family to get screened for HBV    4) Metabolic syndrome: obesity, DM2  c/w meds   diet control 63y Female from [facility] with past medical history hypertension, diabetes type II, CAD who presented with altered mental status. Patient was noted on morning of presentation to be very confused, using her feet instead of her hands, attempting to eat her clothing, and drinking water straight from the sink. She was also noted by family to have unsteady gait, requiring assistance to walk despite normally being independent of all ADLs. Patient has no prior history of altered mental status. She denies trauma or recent falls. Hepatology consulted for liver lesions seen on abdominal ultrasound. Patient has never previously been told of any issues with her liver. She denies blood transfusions or tattoos. She is originally from [facility]. She does not note abdominal pain, yellowing of the skin/eyes, lower extremity edema, or hematemesis.      1) Altered mental status  resolved    2) Abnormal imaging:   ultrasound showing multiple ill-defined liver lesions. Concern for HCC versus metastatic disease versus infection versus cysts.    check MRI liver protocol with ic     3) Cirrhosis on ct,   likely 2/2 chronic HBV, HBs Ag positive, MELD-NA 10  -Varices: Perisplenic varices seen on CT, no prior EGD in the past  -Ascites: None on CT  -PSE:  unsure of cause of altered mentation  normal afp, iron studies   HCV neg,      check Hbe ag and ab, HBV PCR,  check HAV IgG, ximena, ama, asma, ceruloplasmin, alpha 1 antitrypsin  daily LFTs and INR   i/o, low Na diet   needs us abd and afp q 6 months   o/p egd for EV screening   advised family to get screened for HBV    4) Metabolic syndrome: obesity, DM2  c/w meds   diet control       Everett Leon  GI fellow   [phone]

## 2018-10-10 NOTE — PROGRESS NOTE ADULT - SUBJECTIVE AND OBJECTIVE BOX
Patient is a 63y old  Female who presents with a chief complaint of AMS, R/O CVA, (09 Oct 2018 12:06)      SUBJECTIVE / OVERNIGHT EVENTS:    MEDICATIONS  (STANDING):  aspirin enteric coated 81 milliGRAM(s) Oral daily  atorvastatin 40 milliGRAM(s) Oral at bedtime  dextrose 5%. 1000 milliLiter(s) (50 mL/Hr) IV Continuous <Continuous>  dextrose 50% Injectable 12.5 Gram(s) IV Push once  dextrose 50% Injectable 25 Gram(s) IV Push once  dextrose 50% Injectable 25 Gram(s) IV Push once  enoxaparin Injectable 40 milliGRAM(s) SubCutaneous every 24 hours  insulin lispro (HumaLOG) corrective regimen sliding scale   SubCutaneous three times a day before meals  insulin lispro (HumaLOG) corrective regimen sliding scale   SubCutaneous at bedtime  lactulose Syrup 20 Gram(s) Oral three times a day  nortriptyline 50 milliGRAM(s) Oral daily  pantoprazole    Tablet 40 milliGRAM(s) Oral before breakfast    MEDICATIONS  (PRN):  dextrose 40% Gel 15 Gram(s) Oral once PRN Blood Glucose LESS THAN 70 milliGRAM(s)/deciliter  glucagon  Injectable 1 milliGRAM(s) IntraMuscular once PRN Glucose LESS THAN 70 milligrams/deciliter        CAPILLARY BLOOD GLUCOSE      POCT Blood Glucose.: 141 mg/dL (10 Oct 2018 08:30)  POCT Blood Glucose.: 130 mg/dL (09 Oct 2018 22:31)  POCT Blood Glucose.: 229 mg/dL (09 Oct 2018 17:15)  POCT Blood Glucose.: 332 mg/dL (09 Oct 2018 12:33)    I&O's Summary      T(C): 36.5 (10-10-18 @ 04:15), Max: 36.8 (10-10-18 @ 00:15)  HR: 73 (10-10-18 @ 04:15) (73 - 89)  BP: 152/98 (10-10-18 @ 04:15) (140/92 - 153/99)  RR: 18 (10-10-18 @ 04:15) (18 - 18)  SpO2: 100% (10-10-18 @ 04:15) (97% - 100%)    PHYSICAL EXAM:  GENERAL: NAD, well-developed  HEAD:  Atraumatic, Normocephalic  EYES: EOMI, PERRLA, conjunctiva and sclera clear  NECK: Supple, No JVD  CHEST/LUNG: Clear to auscultation bilaterally; No wheeze  HEART: Regular rate and rhythm; No murmurs, rubs, or gallops  ABDOMEN: Soft, Nontender, Nondistended; Bowel sounds present  EXTREMITIES:  2+ Peripheral Pulses, No clubbing, cyanosis, or edema  PSYCH: AAOx3  NEUROLOGY: non-focal  SKIN: No rashes or lesions    LABS:                        10.7   5.56  )-----------( 133      ( 10 Oct 2018 07:20 )             32.0     10-10    137  |  106  |  6<L>  ----------------------------<  188<H>  3.8   |  20<L>  |  0.76    Ca    8.6      10 Oct 2018 07:20  Phos  3.7     10-09  Mg     1.8     10-10    TPro  7.2  /  Alb  3.1<L>  /  TBili  0.8  /  DBili  x   /  AST  37<H>  /  ALT  24  /  AlkPhos  127<H>  10-09    PT/INR - ( 09 Oct 2018 07:30 )   PT: 15.4 SEC;   INR: 1.38          PTT - ( 09 Oct 2018 07:30 )  PTT:37.7 SEC            RADIOLOGY & ADDITIONAL TESTS:    Imaging Personally Reviewed:< from: CT Abdomen and Pelvis w/ IV Cont (10.08.18 @ 20:26) >  PROCEDURE DATE:  Oct  8 2018         INTERPRETATION:  CLINICAL INFORMATION: Multiple ill-defined liver lesions   on sonogram.    COMPARISON: Abdominal sonogram 10/8/2018.    PROCEDURE:   CT of the Abdomen and Pelvis was performed with intravenous contrast.   Intravenous contrast: 90 ml Omnipaque 350. 10 ml discarded.  Oral contrast: None.  Sagittal and coronal reformats were performed.    FINDINGS:    LOWER CHEST: Within normal limits.    LIVER: Cirrhotic morphology. Multiple subcentimeter hypodense lesions,   too small to characterize.  BILE DUCTS: Normal caliber.  GALLBLADDER: Cholecystectomy.  SPLEEN: Splenomegaly.  PANCREAS: Within normal limits.  ADRENALS: Within normal limits.  KIDNEYS/URETERS: Symmetric enhancement without hydronephrosis.   Subcentimeter hypodense focus in the right upper pole, too small to   characterize.    BLADDER: Within normal limits.  REPRODUCTIVE ORGANS: The uterus and adnexa are within normal limits.    BOWEL: No bowel obstruction. Appendix normal.  PERITONEUM: No ascites.  VESSELS: Perisplenic varices and spontaneous splenorenal shunt. Portal,   splenic and superior mesenteric veins are patent. Patent hepatic veins.  RETROPERITONEUM: No lymphadenopathy.    ABDOMINAL WALL: Within normal limits.  BONES: Spinal degenerative changes.    IMPRESSION:  Hepatic cirrhosis.    Splenomegaly and perisplenic varices with spontaneous splenorenal shunt,   suggestive of portal hypertension.    Scattered subcentimeter hypodense liver lesions are too small to   characterize.    < end of copied text >      Consultant(s) Notes Reviewed:      Care Discussed with Consultants/Other Providers: Patient is a 63y old  Female who presents with a chief complaint of AMS, R/O CVA, (09 Oct 2018 12:06)      SUBJECTIVE / OVERNIGHT EVENTS: Family at bedside MS improved. BM this AM. no N/V/SOB    MEDICATIONS  (STANDING):  aspirin enteric coated 81 milliGRAM(s) Oral daily  atorvastatin 40 milliGRAM(s) Oral at bedtime  dextrose 5%. 1000 milliLiter(s) (50 mL/Hr) IV Continuous <Continuous>  dextrose 50% Injectable 12.5 Gram(s) IV Push once  dextrose 50% Injectable 25 Gram(s) IV Push once  dextrose 50% Injectable 25 Gram(s) IV Push once  enoxaparin Injectable 40 milliGRAM(s) SubCutaneous every 24 hours  insulin lispro (HumaLOG) corrective regimen sliding scale   SubCutaneous three times a day before meals  insulin lispro (HumaLOG) corrective regimen sliding scale   SubCutaneous at bedtime  lactulose Syrup 20 Gram(s) Oral three times a day  nortriptyline 50 milliGRAM(s) Oral daily  pantoprazole    Tablet 40 milliGRAM(s) Oral before breakfast    MEDICATIONS  (PRN):  dextrose 40% Gel 15 Gram(s) Oral once PRN Blood Glucose LESS THAN 70 milliGRAM(s)/deciliter  glucagon  Injectable 1 milliGRAM(s) IntraMuscular once PRN Glucose LESS THAN 70 milligrams/deciliter        CAPILLARY BLOOD GLUCOSE      POCT Blood Glucose.: 141 mg/dL (10 Oct 2018 08:30)  POCT Blood Glucose.: 130 mg/dL (09 Oct 2018 22:31)  POCT Blood Glucose.: 229 mg/dL (09 Oct 2018 17:15)  POCT Blood Glucose.: 332 mg/dL (09 Oct 2018 12:33)    I&O's Summary      T(C): 36.5 (10-10-18 @ 04:15), Max: 36.8 (10-10-18 @ 00:15)  HR: 73 (10-10-18 @ 04:15) (73 - 89)  BP: 152/98 (10-10-18 @ 04:15) (140/92 - 153/99)  RR: 18 (10-10-18 @ 04:15) (18 - 18)  SpO2: 100% (10-10-18 @ 04:15) (97% - 100%)    PHYSICAL EXAM:  GENERAL: NAD, well-developed  HEAD:  Atraumatic, Normocephalic  EYES: EOMI, PERRLA, conjunctiva and sclera clear  NECK: Supple, No JVD  CHEST/LUNG: Clear to auscultation bilaterally; No wheeze  HEART: Regular rate and rhythm; No murmurs, rubs, or gallops  ABDOMEN: Soft, Nontender, Nondistended; Bowel sounds present  EXTREMITIES:  2+ Peripheral Pulses, No clubbing, cyanosis, or edema  PSYCH: AAOx3  NEUROLOGY: non-focal  SKIN: No rashes or lesions    LABS:                        10.7   5.56  )-----------( 133      ( 10 Oct 2018 07:20 )             32.0     10-10    137  |  106  |  6<L>  ----------------------------<  188<H>  3.8   |  20<L>  |  0.76    Ca    8.6      10 Oct 2018 07:20  Phos  3.7     10-09  Mg     1.8     10-10    TPro  7.2  /  Alb  3.1<L>  /  TBili  0.8  /  DBili  x   /  AST  37<H>  /  ALT  24  /  AlkPhos  127<H>  10-09    PT/INR - ( 09 Oct 2018 07:30 )   PT: 15.4 SEC;   INR: 1.38          PTT - ( 09 Oct 2018 07:30 )  PTT:37.7 SEC            RADIOLOGY & ADDITIONAL TESTS:    Imaging Personally Reviewed:< from: CT Abdomen and Pelvis w/ IV Cont (10.08.18 @ 20:26) >  PROCEDURE DATE:  Oct  8 2018         INTERPRETATION:  CLINICAL INFORMATION: Multiple ill-defined liver lesions   on sonogram.    COMPARISON: Abdominal sonogram 10/8/2018.    PROCEDURE:   CT of the Abdomen and Pelvis was performed with intravenous contrast.   Intravenous contrast: 90 ml Omnipaque 350. 10 ml discarded.  Oral contrast: None.  Sagittal and coronal reformats were performed.    FINDINGS:    LOWER CHEST: Within normal limits.    LIVER: Cirrhotic morphology. Multiple subcentimeter hypodense lesions,   too small to characterize.  BILE DUCTS: Normal caliber.  GALLBLADDER: Cholecystectomy.  SPLEEN: Splenomegaly.  PANCREAS: Within normal limits.  ADRENALS: Within normal limits.  KIDNEYS/URETERS: Symmetric enhancement without hydronephrosis.   Subcentimeter hypodense focus in the right upper pole, too small to   characterize.    BLADDER: Within normal limits.  REPRODUCTIVE ORGANS: The uterus and adnexa are within normal limits.    BOWEL: No bowel obstruction. Appendix normal.  PERITONEUM: No ascites.  VESSELS: Perisplenic varices and spontaneous splenorenal shunt. Portal,   splenic and superior mesenteric veins are patent. Patent hepatic veins.  RETROPERITONEUM: No lymphadenopathy.    ABDOMINAL WALL: Within normal limits.  BONES: Spinal degenerative changes.    IMPRESSION:  Hepatic cirrhosis.    Splenomegaly and perisplenic varices with spontaneous splenorenal shunt,   suggestive of portal hypertension.    Scattered subcentimeter hypodense liver lesions are too small to   characterize.    < end of copied text >      Consultant(s) Notes Reviewed:      Care Discussed with Consultants/Other Providers:

## 2018-10-11 PROBLEM — I25.10 ATHEROSCLEROTIC HEART DISEASE OF NATIVE CORONARY ARTERY WITHOUT ANGINA PECTORIS: Chronic | Status: ACTIVE | Noted: 2018-10-08

## 2018-10-11 PROBLEM — E11.9 TYPE 2 DIABETES MELLITUS WITHOUT COMPLICATIONS: Chronic | Status: ACTIVE | Noted: 2018-10-08

## 2018-10-11 PROBLEM — Z00.00 ENCOUNTER FOR PREVENTIVE HEALTH EXAMINATION: Status: ACTIVE | Noted: 2018-10-11

## 2018-10-11 PROBLEM — F39 UNSPECIFIED MOOD [AFFECTIVE] DISORDER: Chronic | Status: ACTIVE | Noted: 2018-10-07

## 2018-10-11 LAB
A1AT SERPL-MCNC: 167 MG/DL — SIGNIFICANT CHANGE UP (ref 90–200)
BUN SERPL-MCNC: 6 MG/DL — LOW (ref 7–23)
CALCIUM SERPL-MCNC: 8.9 MG/DL — SIGNIFICANT CHANGE UP (ref 8.4–10.5)
CERULOPLASMIN SERPL-MCNC: 24 MG/DL — SIGNIFICANT CHANGE UP (ref 20–60)
CHLORIDE SERPL-SCNC: 105 MMOL/L — SIGNIFICANT CHANGE UP (ref 98–107)
CO2 SERPL-SCNC: 20 MMOL/L — LOW (ref 22–31)
CREAT SERPL-MCNC: 0.78 MG/DL — SIGNIFICANT CHANGE UP (ref 0.5–1.3)
GAS PNL BLDMV: SIGNIFICANT CHANGE UP
GLUCOSE SERPL-MCNC: 174 MG/DL — HIGH (ref 70–99)
HCT VFR BLD CALC: 31.7 % — LOW (ref 34.5–45)
HGB BLD-MCNC: 10.6 G/DL — LOW (ref 11.5–15.5)
MAGNESIUM SERPL-MCNC: 1.5 MG/DL — LOW (ref 1.6–2.6)
MCHC RBC-ENTMCNC: 28.7 PG — SIGNIFICANT CHANGE UP (ref 27–34)
MCHC RBC-ENTMCNC: 33.4 % — SIGNIFICANT CHANGE UP (ref 32–36)
MCV RBC AUTO: 85.9 FL — SIGNIFICANT CHANGE UP (ref 80–100)
NRBC # FLD: 0 — SIGNIFICANT CHANGE UP
PLATELET # BLD AUTO: 133 K/UL — LOW (ref 150–400)
PMV BLD: 10.2 FL — SIGNIFICANT CHANGE UP (ref 7–13)
POTASSIUM SERPL-MCNC: 3.5 MMOL/L — SIGNIFICANT CHANGE UP (ref 3.5–5.3)
POTASSIUM SERPL-SCNC: 3.5 MMOL/L — SIGNIFICANT CHANGE UP (ref 3.5–5.3)
RBC # BLD: 3.69 M/UL — LOW (ref 3.8–5.2)
RBC # FLD: 14.5 % — SIGNIFICANT CHANGE UP (ref 10.3–14.5)
SODIUM SERPL-SCNC: 138 MMOL/L — SIGNIFICANT CHANGE UP (ref 135–145)
WBC # BLD: 6.22 K/UL — SIGNIFICANT CHANGE UP (ref 3.8–10.5)
WBC # FLD AUTO: 6.22 K/UL — SIGNIFICANT CHANGE UP (ref 3.8–10.5)

## 2018-10-11 PROCEDURE — 99233 SBSQ HOSP IP/OBS HIGH 50: CPT

## 2018-10-11 PROCEDURE — 99232 SBSQ HOSP IP/OBS MODERATE 35: CPT | Mod: GC

## 2018-10-11 RX ORDER — ACETAMINOPHEN 500 MG
650 TABLET ORAL ONCE
Qty: 0 | Refills: 0 | Status: COMPLETED | OUTPATIENT
Start: 2018-10-11 | End: 2018-10-11

## 2018-10-11 RX ORDER — MAGNESIUM SULFATE 500 MG/ML
2 VIAL (ML) INJECTION ONCE
Qty: 0 | Refills: 0 | Status: COMPLETED | OUTPATIENT
Start: 2018-10-11 | End: 2018-10-11

## 2018-10-11 RX ADMIN — Medication 650 MILLIGRAM(S): at 06:50

## 2018-10-11 RX ADMIN — LACTULOSE 20 GRAM(S): 10 SOLUTION ORAL at 21:48

## 2018-10-11 RX ADMIN — Medication 650 MILLIGRAM(S): at 06:16

## 2018-10-11 RX ADMIN — Medication 1: at 12:58

## 2018-10-11 RX ADMIN — Medication 50 GRAM(S): at 12:05

## 2018-10-11 RX ADMIN — Medication 81 MILLIGRAM(S): at 11:50

## 2018-10-11 RX ADMIN — ENOXAPARIN SODIUM 40 MILLIGRAM(S): 100 INJECTION SUBCUTANEOUS at 06:08

## 2018-10-11 RX ADMIN — PANTOPRAZOLE SODIUM 40 MILLIGRAM(S): 20 TABLET, DELAYED RELEASE ORAL at 06:08

## 2018-10-11 RX ADMIN — LACTULOSE 20 GRAM(S): 10 SOLUTION ORAL at 13:01

## 2018-10-11 RX ADMIN — NORTRIPTYLINE HYDROCHLORIDE 50 MILLIGRAM(S): 10 CAPSULE ORAL at 11:50

## 2018-10-11 RX ADMIN — LACTULOSE 20 GRAM(S): 10 SOLUTION ORAL at 06:08

## 2018-10-11 RX ADMIN — Medication 650 MILLIGRAM(S): at 21:56

## 2018-10-11 RX ADMIN — Medication 4: at 17:42

## 2018-10-11 RX ADMIN — ATORVASTATIN CALCIUM 40 MILLIGRAM(S): 80 TABLET, FILM COATED ORAL at 21:48

## 2018-10-11 RX ADMIN — Medication 3: at 21:49

## 2018-10-11 RX ADMIN — Medication 650 MILLIGRAM(S): at 20:53

## 2018-10-11 RX ADMIN — Medication 1000 UNIT(S): at 11:50

## 2018-10-11 NOTE — PROGRESS NOTE ADULT - PROBLEM SELECTOR PLAN 2
- NIDDM2  - On Metformin at home  - A1c 7.6  -monitor FS ISS  -FS elevated 200's add lantus 5 U sq qhs

## 2018-10-11 NOTE — PROGRESS NOTE ADULT - PROBLEM SELECTOR PLAN 7
- Home antihypertensives: Losartan and HCTZ  - Will continue Losartan for now. Holding HCTZ due to hypokalemia  -monitor BP (110-140)

## 2018-10-11 NOTE — PROGRESS NOTE ADULT - SUBJECTIVE AND OBJECTIVE BOX
Patient is a 63y old  Female who presents with a chief complaint of AMS, R/O CVA, (10 Oct 2018 15:55)      SUBJECTIVE / OVERNIGHT EVENTS:    MEDICATIONS  (STANDING):  aspirin enteric coated 81 milliGRAM(s) Oral daily  atorvastatin 40 milliGRAM(s) Oral at bedtime  cholecalciferol 1000 Unit(s) Oral daily  dextrose 5%. 1000 milliLiter(s) (50 mL/Hr) IV Continuous <Continuous>  dextrose 50% Injectable 12.5 Gram(s) IV Push once  dextrose 50% Injectable 25 Gram(s) IV Push once  dextrose 50% Injectable 25 Gram(s) IV Push once  enoxaparin Injectable 40 milliGRAM(s) SubCutaneous every 24 hours  insulin lispro (HumaLOG) corrective regimen sliding scale   SubCutaneous three times a day before meals  insulin lispro (HumaLOG) corrective regimen sliding scale   SubCutaneous at bedtime  lactulose Syrup 20 Gram(s) Oral three times a day  magnesium sulfate  IVPB 2 Gram(s) IV Intermittent once  nortriptyline 50 milliGRAM(s) Oral daily  pantoprazole    Tablet 40 milliGRAM(s) Oral before breakfast    MEDICATIONS  (PRN):  dextrose 40% Gel 15 Gram(s) Oral once PRN Blood Glucose LESS THAN 70 milliGRAM(s)/deciliter  glucagon  Injectable 1 milliGRAM(s) IntraMuscular once PRN Glucose LESS THAN 70 milligrams/deciliter        CAPILLARY BLOOD GLUCOSE      POCT Blood Glucose.: 131 mg/dL (11 Oct 2018 08:37)  POCT Blood Glucose.: 284 mg/dL (10 Oct 2018 21:50)  POCT Blood Glucose.: 237 mg/dL (10 Oct 2018 17:28)  POCT Blood Glucose.: 297 mg/dL (10 Oct 2018 12:49)    I&O's Summary      T(C): 36.6 (10-11-18 @ 05:25), Max: 36.8 (10-10-18 @ 18:54)  HR: 80 (10-11-18 @ 05:25) (78 - 81)  BP: 138/85 (10-11-18 @ 05:25) (114/94 - 138/85)  RR: 18 (10-11-18 @ 05:25) (16 - 18)  SpO2: 96% (10-11-18 @ 05:25) (96% - 100%)    PHYSICAL EXAM:  GENERAL: NAD, well-developed  HEAD:  Atraumatic, Normocephalic  EYES: EOMI, PERRLA, conjunctiva and sclera clear  NECK: Supple, No JVD  CHEST/LUNG: Clear to auscultation bilaterally; No wheeze  HEART: Regular rate and rhythm; No murmurs, rubs, or gallops  ABDOMEN: Soft, Nontender, Nondistended; Bowel sounds present  EXTREMITIES:  2+ Peripheral Pulses, No clubbing, cyanosis, or edema  PSYCH: AAOx3  NEUROLOGY: non-focal  SKIN: No rashes or lesions    LABS:                        10.6   6.22  )-----------( 133      ( 11 Oct 2018 07:00 )             31.7     10-11    138  |  105  |  6<L>  ----------------------------<  174<H>  3.5   |  20<L>  |  0.78    Ca    8.9      11 Oct 2018 07:00  Mg     1.5     10-11                  RADIOLOGY & ADDITIONAL TESTS:    Imaging Personally Reviewed:    Consultant(s) Notes Reviewed:      Care Discussed with Consultants/Other Providers: Patient is a 63y old  Female who presents with a chief complaint of AMS, R/O CVA, (10 Oct 2018 15:55)      SUBJECTIVE / OVERNIGHT EVENTS:    MEDICATIONS  (STANDING):  aspirin enteric coated 81 milliGRAM(s) Oral daily  atorvastatin 40 milliGRAM(s) Oral at bedtime  cholecalciferol 1000 Unit(s) Oral daily  dextrose 5%. 1000 milliLiter(s) (50 mL/Hr) IV Continuous <Continuous>  dextrose 50% Injectable 12.5 Gram(s) IV Push once  dextrose 50% Injectable 25 Gram(s) IV Push once  dextrose 50% Injectable 25 Gram(s) IV Push once  enoxaparin Injectable 40 milliGRAM(s) SubCutaneous every 24 hours  insulin lispro (HumaLOG) corrective regimen sliding scale   SubCutaneous three times a day before meals  insulin lispro (HumaLOG) corrective regimen sliding scale   SubCutaneous at bedtime  lactulose Syrup 20 Gram(s) Oral three times a day  magnesium sulfate  IVPB 2 Gram(s) IV Intermittent once  nortriptyline 50 milliGRAM(s) Oral daily  pantoprazole    Tablet 40 milliGRAM(s) Oral before breakfast    MEDICATIONS  (PRN):  dextrose 40% Gel 15 Gram(s) Oral once PRN Blood Glucose LESS THAN 70 milliGRAM(s)/deciliter  glucagon  Injectable 1 milliGRAM(s) IntraMuscular once PRN Glucose LESS THAN 70 milligrams/deciliter        CAPILLARY BLOOD GLUCOSE      POCT Blood Glucose.: 131 mg/dL (11 Oct 2018 08:37)  POCT Blood Glucose.: 284 mg/dL (10 Oct 2018 21:50)  POCT Blood Glucose.: 237 mg/dL (10 Oct 2018 17:28)  POCT Blood Glucose.: 297 mg/dL (10 Oct 2018 12:49)    I&O's Summary      T(C): 36.6 (10-11-18 @ 05:25), Max: 36.8 (10-10-18 @ 18:54)  HR: 80 (10-11-18 @ 05:25) (78 - 81)  BP: 138/85 (10-11-18 @ 05:25) (114/94 - 138/85)  RR: 18 (10-11-18 @ 05:25) (16 - 18)  SpO2: 96% (10-11-18 @ 05:25) (96% - 100%)    PHYSICAL EXAM:  GENERAL: NAD, well-developed  HEAD:  Atraumatic, Normocephalic  EYES: EOMI, PERRLA, conjunctiva and sclera clear  NECK: Supple, No JVD  CHEST/LUNG: Clear to auscultation bilaterally; No wheeze  HEART: Regular rate and rhythm; No murmurs, rubs, or gallops  ABDOMEN: Soft, Nontender, Nondistended; Bowel sounds present  EXTREMITIES:  2+ Peripheral Pulses, No clubbing, cyanosis, or edema  PSYCH: AAOx3  NEUROLOGY: non-focal  SKIN: No rashes or lesions    LABS:                        10.6   6.22  )-----------( 133      ( 11 Oct 2018 07:00 )             31.7     10    138  |  105  |  6<L>  ----------------------------<  174<H>  3.5   |  20<L>  |  0.78    Ca    8.9      11 Oct 2018 07:00  Mg     1.5     10-11        Hepatitis B PCR Quantitative: [id number] IU/mL (10.09.18 @ 13:40)    Alpha Fetoprotein - Tumor Marker: 7.5: Method: Roche Electrochemilumenescence  Values obtained with different assay methods or kits  cannot be  used interchangeably.  Results cannot be interpreted as absolute evidence of the  presence  or absence of malignant disease.  AFP values are not interpretable in pregnant females. ng/mL (10.10.18 @ 07:20)      Hepatitis B DNA PCR Lo.68: HBV DNA Quantification by Real Time PCR using Abbott m2000:  Assay dynamic range:  15 IU/mL to 1,000,000,000 HBV DNA IU/mL  1.18 (log10) IU/mL to 9.00(log10) IU/mL  Assay reference range: Not Detected  The results of this test should be interpreted with  consideration of all clinical and laboratory findings. A  result of  No HBV DNA Detected does not preclude the  possibility of infection with hepatitis B virus.  In  particular, caution should be used when interpreting low  level positive resultswhen the test is used for  diagnostic purposes. LogIU/mL (10.09.18 @ 13:40)            RADIOLOGY & ADDITIONAL TESTS:    Imaging Personally Reviewed:    Consultant(s) Notes Reviewed:      Care Discussed with Consultants/Other Providers: Patient is a 63y old  Female who presents with a chief complaint of AMS, R/O CVA, (10 Oct 2018 15:55)      SUBJECTIVE / OVERNIGHT EVENTS: pt in NAD no acute events ON. 3 BMs yesterday denies SOB/N/V/D    MEDICATIONS  (STANDING):  aspirin enteric coated 81 milliGRAM(s) Oral daily  atorvastatin 40 milliGRAM(s) Oral at bedtime  cholecalciferol 1000 Unit(s) Oral daily  dextrose 5%. 1000 milliLiter(s) (50 mL/Hr) IV Continuous <Continuous>  dextrose 50% Injectable 12.5 Gram(s) IV Push once  dextrose 50% Injectable 25 Gram(s) IV Push once  dextrose 50% Injectable 25 Gram(s) IV Push once  enoxaparin Injectable 40 milliGRAM(s) SubCutaneous every 24 hours  insulin lispro (HumaLOG) corrective regimen sliding scale   SubCutaneous three times a day before meals  insulin lispro (HumaLOG) corrective regimen sliding scale   SubCutaneous at bedtime  lactulose Syrup 20 Gram(s) Oral three times a day  magnesium sulfate  IVPB 2 Gram(s) IV Intermittent once  nortriptyline 50 milliGRAM(s) Oral daily  pantoprazole    Tablet 40 milliGRAM(s) Oral before breakfast    MEDICATIONS  (PRN):  dextrose 40% Gel 15 Gram(s) Oral once PRN Blood Glucose LESS THAN 70 milliGRAM(s)/deciliter  glucagon  Injectable 1 milliGRAM(s) IntraMuscular once PRN Glucose LESS THAN 70 milligrams/deciliter        CAPILLARY BLOOD GLUCOSE      POCT Blood Glucose.: 131 mg/dL (11 Oct 2018 08:37)  POCT Blood Glucose.: 284 mg/dL (10 Oct 2018 21:50)  POCT Blood Glucose.: 237 mg/dL (10 Oct 2018 17:28)  POCT Blood Glucose.: 297 mg/dL (10 Oct 2018 12:49)    I&O's Summary      T(C): 36.6 (10-11-18 @ 05:25), Max: 36.8 (10-10-18 @ 18:54)  HR: 80 (10-11-18 @ 05:25) (78 - 81)  BP: 138/85 (10-11-18 @ 05:25) (114/94 - 138/85)  RR: 18 (10-11-18 @ 05:25) (16 - 18)  SpO2: 96% (10-11-18 @ 05:25) (96% - 100%)    PHYSICAL EXAM:  GENERAL: NAD, well-developed  HEAD:  Atraumatic, Normocephalic  EYES: EOMI, PERRLA, conjunctiva and sclera clear  NECK: Supple, No JVD  CHEST/LUNG: Clear to auscultation bilaterally; No wheeze  HEART: Regular rate and rhythm; No murmurs, rubs, or gallops  ABDOMEN: Soft, Nontender, Nondistended; Bowel sounds present  EXTREMITIES:  2+ Peripheral Pulses, No clubbing, cyanosis, or edema  PSYCH: AAOx3  NEUROLOGY: non-focal  SKIN: No rashes or lesions    LABS:                        10.6   6.22  )-----------( 133      ( 11 Oct 2018 07:00 )             31.7     10-11    138  |  105  |  6<L>  ----------------------------<  174<H>  3.5   |  20<L>  |  0.78    Ca    8.9      11 Oct 2018 07:00  Mg     1.5     10-11        Hepatitis B PCR Quantitative: [id number] IU/mL (10.09.18 @ 13:40)    Alpha Fetoprotein - Tumor Marker: 7.5: Method: Roche Electrochemilumenescence  Values obtained with different assay methods or kits  cannot be  used interchangeably.  Results cannot be interpreted as absolute evidence of the  presence  or absence of malignant disease.  AFP values are not interpretable in pregnant females. ng/mL (10.10.18 @ 07:20)      Hepatitis B DNA PCR Lo.68: HBV DNA Quantification by Real Time PCR using Abbott m2000:  Assay dynamic range:  15 IU/mL to 1,000,000,000 HBV DNA IU/mL  1.18 (log10) IU/mL to 9.00(log10) IU/mL  Assay reference range: Not Detected  The results of this test should be interpreted with  consideration of all clinical and laboratory findings. A  result of  No HBV DNA Detected does not preclude the  possibility of infection with hepatitis B virus.  In  particular, caution should be used when interpreting low  level positive resultswhen the test is used for  diagnostic purposes. LogIU/mL (10.09.18 @ 13:40)            RADIOLOGY & ADDITIONAL TESTS:    Imaging Personally Reviewed:    Consultant(s) Notes Reviewed:      Care Discussed with Consultants/Other Providers:

## 2018-10-11 NOTE — PROGRESS NOTE ADULT - SUBJECTIVE AND OBJECTIVE BOX
INTERVAL HPI/OVERNIGHT EVENTS:    63y Female from [facility] with past medical history hypertension, diabetes type II, CAD who presented with altered mental status. Patient was noted on morning of presentation to be very confused, using her feet instead of her hands, attempting to eat her clothing, and drinking water straight from the sink. She was also noted by family to have unsteady gait, requiring assistance to walk despite normally being independent of all ADLs. Patient has no prior history of altered mental status. She denies trauma or recent falls. Hepatology consulted for liver lesions seen on abdominal ultrasound. Patient has never previously been told of any issues with her liver. She denies blood transfusions or tattoos. She is originally from [facility]. She does not note abdominal pain, yellowing of the skin/eyes, lower extremity edema, or hematemesis.      10/10 patient has no complaints, family mentions that patient is at her baseline mentation, usually not oriented to time at baseline as per them   10/11 no complaints, pending mri     PAST MEDICAL & SURGICAL HISTORY:  DM2 (diabetes mellitus, type 2)  CAD (coronary artery disease)  Mood disorder  Hypertension  Diabetes  History of kidney stones      Home Medications:  aspirin 81 mg oral delayed release tablet: 1 tab(s) orally once a day (09 Oct 2018 11:35)  Calcium 600+D oral tablet: 1 tab(s) orally 2 times a day (09 Oct 2018 11:35)  ferrous sulfate 325 mg (65 mg elemental iron) oral tablet: 1 tab(s) orally 2 times a day (09 Oct 2018 11:35)  folic acid 1 mg oral tablet: 1 tab(s) orally once a day (09 Oct 2018 11:35)  losartan-hydrochlorothiazide 100mg-12.5mg oral tablet: 1 tab(s) orally once a day (09 Oct 2018 11:35)  metFORMIN 1000 mg oral tablet: 1 tab(s) orally 2 times a day (09 Oct 2018 11:35)  nortriptyline 50 mg oral capsule: 1 tab(s) orally once a day (09 Oct 2018 11:35)  omeprazole 40 mg oral delayed release capsule: 1 cap(s) orally once a day (09 Oct 2018 11:35)      MEDICATIONS  (STANDING):  aspirin enteric coated 81 milliGRAM(s) Oral daily  atorvastatin 40 milliGRAM(s) Oral at bedtime  cholecalciferol 1000 Unit(s) Oral daily  dextrose 5%. 1000 milliLiter(s) (50 mL/Hr) IV Continuous <Continuous>  dextrose 50% Injectable 12.5 Gram(s) IV Push once  dextrose 50% Injectable 25 Gram(s) IV Push once  dextrose 50% Injectable 25 Gram(s) IV Push once  enoxaparin Injectable 40 milliGRAM(s) SubCutaneous every 24 hours  insulin lispro (HumaLOG) corrective regimen sliding scale   SubCutaneous three times a day before meals  insulin lispro (HumaLOG) corrective regimen sliding scale   SubCutaneous at bedtime  lactulose Syrup 20 Gram(s) Oral three times a day  nortriptyline 50 milliGRAM(s) Oral daily  pantoprazole    Tablet 40 milliGRAM(s) Oral before breakfast    MEDICATIONS  (PRN):  dextrose 40% Gel 15 Gram(s) Oral once PRN Blood Glucose LESS THAN 70 milliGRAM(s)/deciliter  glucagon  Injectable 1 milliGRAM(s) IntraMuscular once PRN Glucose LESS THAN 70 milligrams/deciliter      Allergies    No Known Allergies    Intolerances        Review of systems  General: mild fatigue   Skin: no rash   Ophtalmologic: no visual changes   Respiratory: no shortness of breath   Cardiovascular: no chest pain   Gastrointestinal: as per H&P   Genitourinary: no dysuria   Neurological: no weakness   otherwise as described above     PHYSICAL EXAM:   Vital Signs:  Vital Signs Last 24 Hrs  T(C): 36.7 (11 Oct 2018 13:23), Max: 36.8 (10 Oct 2018 18:54)  T(F): 98.1 (11 Oct 2018 13:23), Max: 98.3 (11 Oct 2018 09:29)  HR: 74 (11 Oct 2018 13:23) (74 - 86)  BP: 151/90 (11 Oct 2018 13:23) (114/94 - 151/90)  BP(mean): --  RR: 18 (11 Oct 2018 09:29) (16 - 18)  SpO2: 98% (11 Oct 2018 13:23) (96% - 100%)  Daily     Daily     GENERAL:  no distress  SKIN: intact  HEENT:  NC/AT,  anicteric  CHEST:   no increased effort, breath sounds clear  HEART:  Regular rhythm  ABDOMEN:  Soft, non-tender, non-distended, normoactive bowel sounds,  no masses  EXTEREMITIES:  no cyanosis  PSYCHIATRIC: normal affect      LABS:                        10.6   6.22  )-----------( 133      ( 11 Oct 2018 07:00 )             31.7       Hemoglobin: 10.6 g/dL (10-11-18 @ 07:00)  Hemoglobin: 10.7 g/dL (10-10-18 @ 07:20)  Hemoglobin: 10.6 g/dL (10-09-18 @ 07:30)      10-11    138  |  105  |  6<L>  ----------------------------<  174<H>  3.5   |  20<L>  |  0.78    Ca    8.9      11 Oct 2018 07:00  Mg     1.5     10-11        INR: 1.38 (10-09-18 @ 07:30)      Alkaline Phosphatase, Serum: 127 u/L (10-09-18 @ 07:30)  Aspartate Aminotransferase (AST/SGOT): 37 u/L (10-09-18 @ 07:30)  Alanine Aminotransferase (ALT/SGPT): 24 u/L (10-09-18 @ 07:30)            RADIOLOGY & ADDITIONAL TESTS:

## 2018-10-11 NOTE — PROGRESS NOTE ADULT - ATTENDING COMMENTS
Patient with HBV infection will be arranged for follow up treatment of HBV as outpatient.  With cirrhosis on CT scan suggest MRI to evaluate for liver mass lesions .  Patient should also have EGD as outpatient to r/o esophageal varices.

## 2018-10-11 NOTE — PROGRESS NOTE ADULT - PROBLEM SELECTOR PLAN 1
-likely due to hepatic encephalopathy HBsAg + NH4 106 GGT elevated with mildly elevated INR 1.2  - No history of liver disease lived in [facility] 1 yr returned early 2018.  -check Hep B PCR-P  -AFP-neg   -monitor LFTs  - CT A/P-multiple lesion too small to characterize cirrhosis with splenomegaly and findings suggestive of portal hypertension .   - B12, folate wnl. Toxicology negative  -titrate lactulose to 2-3 BM/day   - hepatology/GI follow up--MRI abdomen with contrast -likely due to hepatic encephalopathy HBsAg + NH4 106 GGT elevated with mildly elevated INR 1.2  - No history of liver disease lived in [facility] 1 yr returned early 2018.  -check Hep B PCR-elevated   -AFP-neg   -monitor LFTs  - CT A/P-multiple lesion too small to characterize cirrhosis with splenomegaly and findings suggestive of portal hypertension .   - B12, folate wnl. Toxicology negative  -titrate lactulose to 2-3 BM/day   - hepatology/GI follow up--MRI abdomen with contrast

## 2018-10-11 NOTE — PROGRESS NOTE ADULT - ASSESSMENT
63y Female from [facility] with past medical history hypertension, diabetes type II, CAD who presented with altered mental status. Patient was noted on morning of presentation to be very confused, using her feet instead of her hands, attempting to eat her clothing, and drinking water straight from the sink. She was also noted by family to have unsteady gait, requiring assistance to walk despite normally being independent of all ADLs. Patient has no prior history of altered mental status. She denies trauma or recent falls. Hepatology consulted for liver lesions seen on abdominal ultrasound. Patient has never previously been told of any issues with her liver. She denies blood transfusions or tattoos. She is originally from [facility]. She does not note abdominal pain, yellowing of the skin/eyes, lower extremity edema, or hematemesis.      1) Altered mental status  resolved  hepatic encephalopathy less likely in view of rapid resolution of symptoms     2) Abnormal imaging:   ultrasound showing multiple ill-defined liver lesions    pending MRI liver protocol with ic     3) Cirrhosis on ct,   likely 2/2 chronic HBV, HBs Ag positive, MELD-NA 10  -Varices: Perisplenic varices seen on CT, no prior EGD in the past  -Ascites: None on CT  -PSE:  unsure of cause of altered mentation  normal afp, iron studies   HCV neg,    Hbe ag neg, HBV PCR [id number]    f/u HBe ab   check HAV IgG, ximena, ama, asma, ceruloplasmin, alpha 1 antitrypsin  vaccinate for HAV if negative   needs us abd and afp q 6 months   o/p egd for EV screening   advised family to get screened for HBV  HBV treatment to be started o/p, fu in liver clinic on 10/18/2018 at 9.30 am     4) Metabolic syndrome: obesity, DM2  c/w meds   diet control       Everett Leon  GI fellow   [phone]

## 2018-10-12 ENCOUNTER — TRANSCRIPTION ENCOUNTER (OUTPATIENT)
Age: 63
End: 2018-10-12

## 2018-10-12 VITALS
HEART RATE: 81 BPM | RESPIRATION RATE: 18 BRPM | OXYGEN SATURATION: 99 % | DIASTOLIC BLOOD PRESSURE: 88 MMHG | SYSTOLIC BLOOD PRESSURE: 140 MMHG | TEMPERATURE: 98 F

## 2018-10-12 LAB
ALBUMIN SERPL ELPH-MCNC: 3.1 G/DL — LOW (ref 3.3–5)
ALP SERPL-CCNC: 129 U/L — HIGH (ref 40–120)
ALT FLD-CCNC: 23 U/L — SIGNIFICANT CHANGE UP (ref 4–33)
ANA TITR SER: NEGATIVE — SIGNIFICANT CHANGE UP
AST SERPL-CCNC: 37 U/L — HIGH (ref 4–32)
BILIRUB DIRECT SERPL-MCNC: 0.2 MG/DL — SIGNIFICANT CHANGE UP (ref 0.1–0.2)
BILIRUB SERPL-MCNC: 0.7 MG/DL — SIGNIFICANT CHANGE UP (ref 0.2–1.2)
BUN SERPL-MCNC: 5 MG/DL — LOW (ref 7–23)
CALCIUM SERPL-MCNC: 9.1 MG/DL — SIGNIFICANT CHANGE UP (ref 8.4–10.5)
CHLORIDE SERPL-SCNC: 105 MMOL/L — SIGNIFICANT CHANGE UP (ref 98–107)
CO2 SERPL-SCNC: 19 MMOL/L — LOW (ref 22–31)
CREAT SERPL-MCNC: 0.82 MG/DL — SIGNIFICANT CHANGE UP (ref 0.5–1.3)
GLUCOSE SERPL-MCNC: 142 MG/DL — HIGH (ref 70–99)
HCT VFR BLD CALC: 32.5 % — LOW (ref 34.5–45)
HGB BLD-MCNC: 10.6 G/DL — LOW (ref 11.5–15.5)
MAGNESIUM SERPL-MCNC: 1.7 MG/DL — SIGNIFICANT CHANGE UP (ref 1.6–2.6)
MCHC RBC-ENTMCNC: 27.7 PG — SIGNIFICANT CHANGE UP (ref 27–34)
MCHC RBC-ENTMCNC: 32.6 % — SIGNIFICANT CHANGE UP (ref 32–36)
MCV RBC AUTO: 84.9 FL — SIGNIFICANT CHANGE UP (ref 80–100)
MITOCHONDRIA AB SER-ACNC: SIGNIFICANT CHANGE UP
NRBC # FLD: 0 — SIGNIFICANT CHANGE UP
PLATELET # BLD AUTO: 130 K/UL — LOW (ref 150–400)
PMV BLD: 10.3 FL — SIGNIFICANT CHANGE UP (ref 7–13)
POTASSIUM SERPL-MCNC: 3.5 MMOL/L — SIGNIFICANT CHANGE UP (ref 3.5–5.3)
POTASSIUM SERPL-SCNC: 3.5 MMOL/L — SIGNIFICANT CHANGE UP (ref 3.5–5.3)
PROT SERPL-MCNC: 7.7 G/DL — SIGNIFICANT CHANGE UP (ref 6–8.3)
RBC # BLD: 3.83 M/UL — SIGNIFICANT CHANGE UP (ref 3.8–5.2)
RBC # FLD: 14.6 % — HIGH (ref 10.3–14.5)
SODIUM SERPL-SCNC: 139 MMOL/L — SIGNIFICANT CHANGE UP (ref 135–145)
WBC # BLD: 4.9 K/UL — SIGNIFICANT CHANGE UP (ref 3.8–10.5)
WBC # FLD AUTO: 4.9 K/UL — SIGNIFICANT CHANGE UP (ref 3.8–10.5)

## 2018-10-12 PROCEDURE — 74182 MRI ABDOMEN W/CONTRAST: CPT | Mod: 26

## 2018-10-12 PROCEDURE — 99239 HOSP IP/OBS DSCHRG MGMT >30: CPT

## 2018-10-12 PROCEDURE — 99232 SBSQ HOSP IP/OBS MODERATE 35: CPT | Mod: GC

## 2018-10-12 RX ORDER — AMLODIPINE BESYLATE 2.5 MG/1
2.5 TABLET ORAL DAILY
Qty: 0 | Refills: 0 | Status: DISCONTINUED | OUTPATIENT
Start: 2018-10-12 | End: 2018-10-12

## 2018-10-12 RX ORDER — INFLUENZA VIRUS VACCINE 15; 15; 15; 15 UG/.5ML; UG/.5ML; UG/.5ML; UG/.5ML
0.5 SUSPENSION INTRAMUSCULAR ONCE
Qty: 0 | Refills: 0 | Status: COMPLETED | OUTPATIENT
Start: 2018-10-12 | End: 2018-10-12

## 2018-10-12 RX ORDER — SITAGLIPTIN 50 MG/1
1 TABLET, FILM COATED ORAL
Qty: 30 | Refills: 0 | OUTPATIENT
Start: 2018-10-12 | End: 2018-11-10

## 2018-10-12 RX ORDER — LOSARTAN/HYDROCHLOROTHIAZIDE 100MG-25MG
1 TABLET ORAL
Qty: 0 | Refills: 0 | COMMUNITY

## 2018-10-12 RX ORDER — INSULIN GLARGINE 100 [IU]/ML
5 INJECTION, SOLUTION SUBCUTANEOUS AT BEDTIME
Qty: 0 | Refills: 0 | Status: DISCONTINUED | OUTPATIENT
Start: 2018-10-12 | End: 2018-10-12

## 2018-10-12 RX ORDER — ACETAMINOPHEN 500 MG
650 TABLET ORAL ONCE
Qty: 0 | Refills: 0 | Status: COMPLETED | OUTPATIENT
Start: 2018-10-12 | End: 2018-10-12

## 2018-10-12 RX ORDER — ATORVASTATIN CALCIUM 80 MG/1
1 TABLET, FILM COATED ORAL
Qty: 30 | Refills: 0 | OUTPATIENT
Start: 2018-10-12 | End: 2018-11-10

## 2018-10-12 RX ORDER — LACTULOSE 10 G/15ML
30 SOLUTION ORAL
Qty: 2700 | Refills: 0 | OUTPATIENT
Start: 2018-10-12 | End: 2018-11-10

## 2018-10-12 RX ORDER — FERROUS SULFATE 325(65) MG
1 TABLET ORAL
Qty: 0 | Refills: 0 | COMMUNITY

## 2018-10-12 RX ORDER — AMLODIPINE BESYLATE 2.5 MG/1
1 TABLET ORAL
Qty: 30 | Refills: 0 | OUTPATIENT
Start: 2018-10-12 | End: 2018-11-10

## 2018-10-12 RX ADMIN — Medication 2: at 13:15

## 2018-10-12 RX ADMIN — NORTRIPTYLINE HYDROCHLORIDE 50 MILLIGRAM(S): 10 CAPSULE ORAL at 12:12

## 2018-10-12 RX ADMIN — Medication 650 MILLIGRAM(S): at 13:32

## 2018-10-12 RX ADMIN — PANTOPRAZOLE SODIUM 40 MILLIGRAM(S): 20 TABLET, DELAYED RELEASE ORAL at 05:17

## 2018-10-12 RX ADMIN — Medication 81 MILLIGRAM(S): at 12:12

## 2018-10-12 RX ADMIN — Medication 650 MILLIGRAM(S): at 14:20

## 2018-10-12 RX ADMIN — Medication 1000 UNIT(S): at 12:12

## 2018-10-12 RX ADMIN — LACTULOSE 20 GRAM(S): 10 SOLUTION ORAL at 05:17

## 2018-10-12 RX ADMIN — ENOXAPARIN SODIUM 40 MILLIGRAM(S): 100 INJECTION SUBCUTANEOUS at 05:16

## 2018-10-12 NOTE — PROGRESS NOTE ADULT - ASSESSMENT
1) Altered mental status  resolved  hepatic encephalopathy less likely in view of rapid resolution of symptoms     2) Abnormal imaging:   ultrasound showing multiple ill-defined liver lesions    pending MRI liver protocol with ic     3) Cirrhosis on CT   likely 2/2 chronic HBV, HBs Ag positive, MELD-NA 10  -Varices: Perisplenic varices seen on CT, no prior EGD in the past  -Ascites: None on CT  -PSE:  unsure of cause of altered mentation  Work up: Normal AFP and iron studies, HCV neg, Hbe ag neg, HBV PCR [id number], ceruloplasmin, alpha1 antitrypsin and RUBY wnl    4) Metabolic syndrome: obesity, DM2    Recs:  - Follow up remaining hepatitis work up  - Pending MRI abdomen/pelvis with contrast for better evaluation of liver lesions  - pt would benefit from EGD for varices surveillance, Hep A vaccine if non immune, and outpatient hepatology followup for HBV and HCC (if present) treatment  - family members advised to get Hep B status checked  - daily CBC, CMP, INR  - HBV treatment to be started o/p, fu in liver clinic on 10/18/2018 at 9.30 am     Cecilia Lanza, PGY4  Gastroenterology and Hepatology Fellow  Pager # [phone] / [phone]) Altered mental status  resolved  hepatic encephalopathy less likely in view of rapid resolution of symptoms     2) Abnormal imaging:   ultrasound showing multiple ill-defined liver lesions    pending MRI liver protocol with ic     3) Cirrhosis on CT   likely 2/2 chronic HBV, HBs Ag positive, MELD-NA 10  -Varices: Perisplenic varices seen on CT, no prior EGD in the past  -Ascites: None on CT  -PSE:  unsure of cause of altered mentation  Work up: Normal AFP and iron studies, HCV neg, Hbe ag neg, HBV PCR [id number], ceruloplasmin, alpha1 antitrypsin and RUBY wnl    4) Metabolic syndrome: obesity, DM2    Recs:  - Follow up remaining hepatitis work up  - Pending MRI abdomen/pelvis with contrast for better evaluation of liver lesions  - pt would benefit from EGD for varices surveillance, Hep A vaccine if non immune, and outpatient hepatology followup for HBV and HCC (if present) treatment  - family members advised to get Hep B status checked  - daily CBC, CMP, INR  - HBV treatment to be started o/p, fu in liver clinic on 10/18/2018 at 9.30 am   - No GI contraindication to discharge after MRI    Cecilia Lanza, PGY4  Gastroenterology and Hepatology Fellow  Pager # [phone] / [phone]

## 2018-10-12 NOTE — PROGRESS NOTE ADULT - ASSESSMENT
63F with PMH DMT2, HTN, CAD, anemia presents from home with altered mental status (tried to drink water out of the sink, tried to lift her foot into the sink and was also seen biting on her cloth during breakfast mistaking it for an apple she held). CTA H/N neg for CVA elevated NH4 noted US multiple ill define liver lesion. +HBsAg CT A/P-+cirrhosis

## 2018-10-12 NOTE — DISCHARGE NOTE ADULT - CARE PLAN
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  Secondary Diagnosis:	Encephalopathy  Assessment and plan of treatment:	Altered mental status resolved, may have been due to hepatic encephalopathy.  Secondary Diagnosis:	Essential hypertension  Assessment and plan of treatment:	Low sodium and fat diet, continue anti-hypertensive medications, and follow up with primary care physician.  Secondary Diagnosis:	Toothache  Assessment and plan of treatment:	Follow up with your dentist as an outpatient if pain continues  Secondary Diagnosis:	DM2 (diabetes mellitus, type 2)  Assessment and plan of treatment:	Monitor finger sticks pre-meal and bedtime, low salt, fat and carbohydrate diet, minimize glucose intake.  Exercise daily for at least 30 minutes and weight loss.  Follow up with primary care physician and endocrinologist for routine Hemoglobin A1C checks and management.  Follow up with your ophthalmologist for routine yearly vision exams.

## 2018-10-12 NOTE — PROGRESS NOTE ADULT - ATTENDING COMMENTS
discharge post MRI after review with hepatology if no further w/u inpt discharge post MRI after review with hepatology if no further w/u inpt. 37 min.

## 2018-10-12 NOTE — PROGRESS NOTE ADULT - PROBLEM SELECTOR PLAN 5
- No history of thyroid dysfunction  - CTA head/neck showing 1.1 cm left inferior thyroid lobe nodule US 1cm LT lower pole nodule  - TSH- 5.4 FT4 WNL likey need outpt f/u in 6 mo vs biopsy as outpt

## 2018-10-12 NOTE — PROGRESS NOTE ADULT - PROBLEM SELECTOR PLAN 7
- Home antihypertensives: Losartan and HCTZ  - Will continue Losartan for now. Held HCTZ due to hypokalemia   -add low dose norvasc 2.5 mg   -monitor BP (130-150)

## 2018-10-12 NOTE — PROGRESS NOTE ADULT - ATTENDING COMMENTS
Patient with HBV has had MRI, results pending.  Patient will arrange outpatient follow up and then initiate HBV antiviral therapy.

## 2018-10-12 NOTE — DISCHARGE NOTE ADULT - MEDICATION SUMMARY - MEDICATIONS TO STOP TAKING
I will STOP taking the medications listed below when I get home from the hospital:    ferrous sulfate 325 mg (65 mg elemental iron) oral delayed release tablet  -- 1 tab(s) by mouth once a day    Calcium 600+D oral tablet  -- 1 tab(s) by mouth 2 times a day    losartan-hydrochlorothiazide 100mg-12.5mg oral tablet  -- 1 tab(s) by mouth once a day

## 2018-10-12 NOTE — PROGRESS NOTE ADULT - PROBLEM SELECTOR PROBLEM 2
Hypercalcemia
DM2 (diabetes mellitus, type 2)

## 2018-10-12 NOTE — PROGRESS NOTE ADULT - SUBJECTIVE AND OBJECTIVE BOX
Chief Complaint:  Patient is a 63y old  Female who presents with a chief complaint of AMS, R/O CVA, (12 Oct 2018 08:49)      Interval Events: Pt remains alert and oriented this morning. She offers no complaints today.    today  ROS: All 12 point system except listed above were otherwise negative.    Allergies:  No Known Allergies        Hospital Medications:  amLODIPine   Tablet 2.5 milliGRAM(s) Oral daily  aspirin enteric coated 81 milliGRAM(s) Oral daily  atorvastatin 40 milliGRAM(s) Oral at bedtime  cholecalciferol 1000 Unit(s) Oral daily  dextrose 40% Gel 15 Gram(s) Oral once PRN  dextrose 5%. 1000 milliLiter(s) IV Continuous <Continuous>  dextrose 50% Injectable 12.5 Gram(s) IV Push once  dextrose 50% Injectable 25 Gram(s) IV Push once  dextrose 50% Injectable 25 Gram(s) IV Push once  enoxaparin Injectable 40 milliGRAM(s) SubCutaneous every 24 hours  glucagon  Injectable 1 milliGRAM(s) IntraMuscular once PRN  insulin lispro (HumaLOG) corrective regimen sliding scale   SubCutaneous at bedtime  insulin lispro (HumaLOG) corrective regimen sliding scale   SubCutaneous three times a day before meals  lactulose Syrup 20 Gram(s) Oral three times a day  nortriptyline 50 milliGRAM(s) Oral daily  pantoprazole    Tablet 40 milliGRAM(s) Oral before breakfast      PMHX/PSHX:  DM2 (diabetes mellitus, type 2)  CAD (coronary artery disease)  Mood disorder  Hypertension  Diabetes  History of kidney stones      Family history:  No pertinent family history in first degree relatives      PHYSICAL EXAM:   Vital Signs:  Vital Signs Last 24 Hrs  T(C): 36.5 (12 Oct 2018 05:20), Max: 36.8 (11 Oct 2018 09:29)  T(F): 97.7 (12 Oct 2018 05:20), Max: 98.3 (11 Oct 2018 09:29)  HR: 81 (12 Oct 2018 05:20) (74 - 86)  BP: 134/92 (12 Oct 2018 05:20) (134/92 - 151/90)  BP(mean): --  RR: 18 (12 Oct 2018 05:20) (18 - 18)  SpO2: 99% (12 Oct 2018 05:20) (98% - 99%)  Daily     Daily Weight in k.5 (12 Oct 2018 00:59)    GENERAL:  no distress  SKIN: intact  HEENT:  NC/AT,  anicteric  CHEST:   no increased effort, breath sounds clear  HEART:  Regular rhythm  ABDOMEN:  Soft, non-tender, non-distended, normoactive bowel sounds,  no masses  EXTEREMITIES:  no cyanosis  PSYCHIATRIC: normal affect    LABS:                        10.6   4.90  )-----------( 130      ( 12 Oct 2018 06:02 )             32.5     Mean Cell Volume: 84.9 fL (10-12-18 @ 06:02)    10-12    139  |  105  |  5<L>  ----------------------------<  142<H>  3.5   |  19<L>  |  0.82    Ca    9.1      12 Oct 2018 06:43  Mg     1.7     10-12    TPro  7.7  /  Alb  3.1<L>  /  TBili  0.7  /  DBili  0.2  /  AST  37<H>  /  ALT  23  /  AlkPhos  129<H>  10-12    LIVER FUNCTIONS - ( 12 Oct 2018 06:43 )  Alb: 3.1 g/dL / Pro: 7.7 g/dL / ALK PHOS: 129 u/L / ALT: 23 u/L / AST: 37 u/L / GGT: x                                       10.6   4.90  )-----------( 130      ( 12 Oct 2018 06:02 )             32.5                         10.6   6.22  )-----------( 133      ( 11 Oct 2018 07:00 )             31.7                         10.7   5.56  )-----------( 133      ( 10 Oct 2018 07:20 )             32.0     Imaging:

## 2018-10-12 NOTE — PROGRESS NOTE ADULT - PROBLEM SELECTOR PLAN 3
-K-3.1 today replete Mag 1.4 replete  -f/u repeat K in AM
- Given elevated protein gap and history of anemia. Suspicion for multiple myeloma   - S/p 1.5L IVF in the ED  - ionized Ca-WNL  - hypercalcemia resolved with IVF check PTH-WNL, vit D 1,25-wnl and 25-low vit d insuff PTHrp-P, phos-WNL, SPEP/UPEP/IF-P  -start vitamin D   - IVF PRN
- Given elevated protein gap and history of anemia. Suspicion for multiple myeloma   - S/p 1.5L IVF in the ED  - ionized Ca-WNL  - hypercalcemia resolved with IVF check PTH-WNL, vit D 1,25-wnl and 25-low vit d insuff PTHrp-P, phos-WNL, SPEP/UPEP/IF-P  -start vitamin D   - IVF PRN
- Given elevated protein gap and history of anemia. Suspicion for multiple myeloma w/u so far neg for MM and Ca now WNL suspect protein gap due to hypoalbuminemia due to cirrhosis  - S/p 1.5L IVF in the ED  - ionized Ca-WNL  - hypercalcemia resolved with IVF check PTH-WNL, vit D 1,25-wnl and 25-low vit d insuff PTHrp-P, phos-WNL, SPEP/IF-polyclonal consistent with inflam no monoclonal component UPEP-neg  -start vitamin D   - IVF PRN

## 2018-10-12 NOTE — PROGRESS NOTE ADULT - PROBLEM SELECTOR PLAN 6
- Not currently on statin or aspirin  - Will start Lipitor 40mg daily and aspirin 81mg

## 2018-10-12 NOTE — PROGRESS NOTE ADULT - SUBJECTIVE AND OBJECTIVE BOX
Patient is a 63y old  Female who presents with a chief complaint of AMS, R/O CVA, (11 Oct 2018 14:23)      SUBJECTIVE / OVERNIGHT EVENTS:    MEDICATIONS  (STANDING):  aspirin enteric coated 81 milliGRAM(s) Oral daily  atorvastatin 40 milliGRAM(s) Oral at bedtime  cholecalciferol 1000 Unit(s) Oral daily  dextrose 5%. 1000 milliLiter(s) (50 mL/Hr) IV Continuous <Continuous>  dextrose 50% Injectable 12.5 Gram(s) IV Push once  dextrose 50% Injectable 25 Gram(s) IV Push once  dextrose 50% Injectable 25 Gram(s) IV Push once  enoxaparin Injectable 40 milliGRAM(s) SubCutaneous every 24 hours  insulin lispro (HumaLOG) corrective regimen sliding scale   SubCutaneous three times a day before meals  insulin lispro (HumaLOG) corrective regimen sliding scale   SubCutaneous at bedtime  lactulose Syrup 20 Gram(s) Oral three times a day  nortriptyline 50 milliGRAM(s) Oral daily  pantoprazole    Tablet 40 milliGRAM(s) Oral before breakfast    MEDICATIONS  (PRN):  dextrose 40% Gel 15 Gram(s) Oral once PRN Blood Glucose LESS THAN 70 milliGRAM(s)/deciliter  glucagon  Injectable 1 milliGRAM(s) IntraMuscular once PRN Glucose LESS THAN 70 milligrams/deciliter        CAPILLARY BLOOD GLUCOSE      POCT Blood Glucose.: 133 mg/dL (12 Oct 2018 08:26)  POCT Blood Glucose.: 367 mg/dL (11 Oct 2018 21:24)  POCT Blood Glucose.: 315 mg/dL (11 Oct 2018 17:13)  POCT Blood Glucose.: 196 mg/dL (11 Oct 2018 12:25)    I&O's Summary      T(C): 36.5 (10-12-18 @ 05:20), Max: 36.8 (10-11-18 @ 09:29)  HR: 81 (10-12-18 @ 05:20) (74 - 86)  BP: 134/92 (10-12-18 @ 05:20) (134/92 - 151/90)  RR: 18 (10-12-18 @ 05:20) (18 - 18)  SpO2: 99% (10-12-18 @ 05:20) (98% - 99%)    PHYSICAL EXAM:  GENERAL: NAD, well-developed  HEAD:  Atraumatic, Normocephalic  EYES: EOMI, PERRLA, conjunctiva and sclera clear  NECK: Supple, No JVD  CHEST/LUNG: Clear to auscultation bilaterally; No wheeze  HEART: Regular rate and rhythm; No murmurs, rubs, or gallops  ABDOMEN: Soft, Nontender, Nondistended; Bowel sounds present  EXTREMITIES:  2+ Peripheral Pulses, No clubbing, cyanosis, or edema  PSYCH: AAOx3  NEUROLOGY: non-focal  SKIN: No rashes or lesions    LABS:                        10.6   4.90  )-----------( 130      ( 12 Oct 2018 06:02 )             32.5     10-12    139  |  105  |  5<L>  ----------------------------<  142<H>  3.5   |  19<L>  |  0.82    Ca    9.1      12 Oct 2018 06:43  Mg     1.7     10-12    TPro  7.7  /  Alb  3.1<L>  /  TBili  0.7  /  DBili  0.2  /  AST  37<H>  /  ALT  23  /  AlkPhos  129<H>  10-12                RADIOLOGY & ADDITIONAL TESTS:    Imaging Personally Reviewed:    Consultant(s) Notes Reviewed:      Care Discussed with Consultants/Other Providers: Patient is a 63y old  Female who presents with a chief complaint of AMS, R/O CVA, (11 Oct 2018 14:23)      SUBJECTIVE / OVERNIGHT EVENTS: Pt in NAD frustrated that MRI not done. d/w MRI Ollie to be scheduled for this AM d/w family at bedside. MS at baseline. Tele SR    MEDICATIONS  (STANDING):  aspirin enteric coated 81 milliGRAM(s) Oral daily  atorvastatin 40 milliGRAM(s) Oral at bedtime  cholecalciferol 1000 Unit(s) Oral daily  dextrose 5%. 1000 milliLiter(s) (50 mL/Hr) IV Continuous <Continuous>  dextrose 50% Injectable 12.5 Gram(s) IV Push once  dextrose 50% Injectable 25 Gram(s) IV Push once  dextrose 50% Injectable 25 Gram(s) IV Push once  enoxaparin Injectable 40 milliGRAM(s) SubCutaneous every 24 hours  insulin lispro (HumaLOG) corrective regimen sliding scale   SubCutaneous three times a day before meals  insulin lispro (HumaLOG) corrective regimen sliding scale   SubCutaneous at bedtime  lactulose Syrup 20 Gram(s) Oral three times a day  nortriptyline 50 milliGRAM(s) Oral daily  pantoprazole    Tablet 40 milliGRAM(s) Oral before breakfast    MEDICATIONS  (PRN):  dextrose 40% Gel 15 Gram(s) Oral once PRN Blood Glucose LESS THAN 70 milliGRAM(s)/deciliter  glucagon  Injectable 1 milliGRAM(s) IntraMuscular once PRN Glucose LESS THAN 70 milligrams/deciliter        CAPILLARY BLOOD GLUCOSE      POCT Blood Glucose.: 133 mg/dL (12 Oct 2018 08:26)  POCT Blood Glucose.: 367 mg/dL (11 Oct 2018 21:24)  POCT Blood Glucose.: 315 mg/dL (11 Oct 2018 17:13)  POCT Blood Glucose.: 196 mg/dL (11 Oct 2018 12:25)    I&O's Summary      T(C): 36.5 (10-12-18 @ 05:20), Max: 36.8 (10-11-18 @ 09:29)  HR: 81 (10-12-18 @ 05:20) (74 - 86)  BP: 134/92 (10-12-18 @ 05:20) (134/92 - 151/90)  RR: 18 (10-12-18 @ 05:20) (18 - 18)  SpO2: 99% (10-12-18 @ 05:20) (98% - 99%)    PHYSICAL EXAM:  GENERAL: NAD, well-developed  HEAD:  Atraumatic, Normocephalic  EYES: EOMI, PERRLA, conjunctiva and sclera clear  NECK: Supple, No JVD  CHEST/LUNG: Clear to auscultation bilaterally; No wheeze  HEART: Regular rate and rhythm; No murmurs, rubs, or gallops  ABDOMEN: Soft, Nontender, Nondistended; Bowel sounds present  EXTREMITIES:  2+ Peripheral Pulses, No clubbing, cyanosis, or edema  PSYCH: AAOx3  NEUROLOGY: non-focal  SKIN: No rashes or lesions    LABS:                        10.6   4.90  )-----------( 130      ( 12 Oct 2018 06:02 )             32.5     10-12    139  |  105  |  5<L>  ----------------------------<  142<H>  3.5   |  19<L>  |  0.82    Ca    9.1      12 Oct 2018 06:43  Mg     1.7     10-12    TPro  7.7  /  Alb  3.1<L>  /  TBili  0.7  /  DBili  0.2  /  AST  37<H>  /  ALT  23  /  AlkPhos  129<H>  10-12                RADIOLOGY & ADDITIONAL TESTS:    Imaging Personally Reviewed:    Consultant(s) Notes Reviewed:      Care Discussed with Consultants/Other Providers:

## 2018-10-12 NOTE — PROGRESS NOTE ADULT - PROBLEM SELECTOR PLAN 2
- NIDDM2  - On Metformin at home  - A1c 7.6  -monitor FS ISS  -FS elevated 300-133   -add januvia 25 on discharge with metformin

## 2018-10-12 NOTE — DISCHARGE NOTE ADULT - HOSPITAL COURSE
63F with PMH DMT2, HTN, CAD, anemia presents from home with altered mental status (tried to drink water out of the sink, tried to lift her foot into the sink and was also seen biting on her cloth during breakfast mistaking it for an apple she held). CTA H/N neg for CVA elevated NH4 noted US multiple ill define liver lesion. +HBsAg CT A/P-+cirrhosis     Problem/Plan - 1:  ·  Problem: Altered mental status.  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   -Hep B PCR-elevated   -AFP-neg   -monitor LFTs  - B12, folate wnl. Toxicology RUBY negative alpha 1 antitrypsin-167   -titrate lactulose to 2-3 BM/day   - hepatology/GI follow up--MRI abdomen with contrast evaluate liver lesion outpt f/u with hepatology in regards to treatment of Hepatitis B on 10/18.   -MRI abdomen revealed ...     Problem/Plan - 2:  ·  Problem: DM2 (diabetes mellitus, type 2).  Plan: - NIDDM2  - On Metformin at home  - A1c 7.6  -monitor FS ISS  -FS elevated 300-133   -add januvia 25 on discharge with metformin.      Problem/Plan - 3:  ·  Problem: Hypercalcemia.  Plan: - Given elevated protein gap and history of anemia. Suspicion for multiple myeloma w/u so far neg for MM and Ca now WNL suspect protein gap due to hypoalbuminemia due to cirrhosis  - S/p 1.5L IVF in the ED  - ionized Ca-WNL  - hypercalcemia resolved with IVF check PTH-WNL, vit D 1,25-wnl and 25-low vit d insuff PTHrp-P, phos-WNL, SPEP/IF-polyclonal consistent with inflam no monoclonal component UPEP-neg  -start vitamin D   - IVF PRN.      Problem/Plan - 4:  ·  Problem: Hypokalemia.  Plan: -resolved.      Problem/Plan - 5:  ·  Problem: Elevated TSH.  Plan: - No history of thyroid dysfunction  - CTA head/neck showing 1.1 cm left inferior thyroid lobe nodule US 1cm LT lower pole nodule  - TSH- 5.4 FT4 WNL likey need outpt f/u in 6 mo vs biopsy as outpt.      Problem/Plan - 6:  Problem: CAD (coronary artery disease). Plan: - Not currently on statin or aspirin  - Will start Lipitor 40mg daily and aspirin 81mg.     Problem/Plan - 7:  ·  Problem: Hypertension.  Plan: - Home antihypertensives: Losartan and HCTZ  - Will continue Losartan for now. Held HCTZ due to hypokalemia   -add low dose norvasc 2.5 mg   -monitor BP (130-150). 63F with PMH DMT2, HTN, CAD, anemia presents from home with altered mental status (tried to drink water out of the sink, tried to lift her foot into the sink and was also seen biting on her cloth during breakfast mistaking it for an apple she held). CTA H/N neg for CVA elevated NH4 noted US multiple ill define liver lesion. +HBsAg CT A/P-+cirrhosis     Problem/Plan - 1:  ·  Problem: Altered mental status.  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   -Hep B PCR-elevated   -AFP-neg   -monitor LFTs  - B12, folate wnl. Toxicology RUBY negative alpha 1 antitrypsin-167   -titrate lactulose to 2-3 BM/day   - hepatology/GI follow up--MRI abdomen with contrast evaluate liver lesion outpt f/u with hepatology in regards to treatment of Hepatitis B on 10/18.   -MRI abdomen revealed cirrhosis, no hematoma of liver.      Problem/Plan - 2:  ·  Problem: DM2 (diabetes mellitus, type 2).  Plan: - NIDDM2  - On Metformin at home  - A1c 7.6  -monitor FS ISS  -FS elevated 300-133   -add januvia 25 on discharge with metformin.      Problem/Plan - 3:  ·  Problem: Hypercalcemia.  Plan: - Given elevated protein gap and history of anemia. Suspicion for multiple myeloma w/u so far neg for MM and Ca now WNL suspect protein gap due to hypoalbuminemia due to cirrhosis  - S/p 1.5L IVF in the ED  - ionized Ca-WNL  - hypercalcemia resolved with IVF check PTH-WNL, vit D 1,25-wnl and 25-low vit d insuff PTHrp-P, phos-WNL, SPEP/IF-polyclonal consistent with inflam no monoclonal component UPEP-neg  -start vitamin D   - IVF PRN.      Problem/Plan - 4:  ·  Problem: Hypokalemia.  Plan: -resolved.      Problem/Plan - 5:  ·  Problem: Elevated TSH.  Plan: - No history of thyroid dysfunction  - CTA head/neck showing 1.1 cm left inferior thyroid lobe nodule US 1cm LT lower pole nodule  - TSH- 5.4 FT4 WNL likey need outpt f/u in 6 mo vs biopsy as outpt.      Problem/Plan - 6:  Problem: CAD (coronary artery disease). Plan: - Not currently on statin or aspirin  - Will start Lipitor 40mg daily and aspirin 81mg.     Problem/Plan - 7:  ·  Problem: Hypertension.  Plan: - Home antihypertensives: Losartan and HCTZ  - Will continue Losartan for now. Held HCTZ due to hypokalemia   -add low dose norvasc 2.5 mg   -monitor BP (130-150).

## 2018-10-12 NOTE — DISCHARGE NOTE ADULT - PLAN OF CARE
Begin treatment You will begin treatment in liver clinic on 10/18/2018 at 9.30 am. Altered mental status resolved, may have been due to hepatic encephalopathy. Low sodium and fat diet, continue anti-hypertensive medications, and follow up with primary care physician. Follow up with your dentist as an outpatient if pain continues Monitor finger sticks pre-meal and bedtime, low salt, fat and carbohydrate diet, minimize glucose intake.  Exercise daily for at least 30 minutes and weight loss.  Follow up with primary care physician and endocrinologist for routine Hemoglobin A1C checks and management.  Follow up with your ophthalmologist for routine yearly vision exams.

## 2018-10-12 NOTE — PROGRESS NOTE ADULT - PROBLEM SELECTOR PLAN 1
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   -Hep B PCR-elevated   -AFP-neg   -monitor LFTs  - B12, folate wnl. 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   -Hep B PCR-elevated   -AFP-neg   -monitor LFTs  - B12, folate wnl. Toxicology RUBY negative alpha 1 antitrypsin-167   -titrate lactulose to 2-3 BM/day   - hepatology/GI follow up--MRI abdomen with contrast evaluate liver lesion outpt f/u with hepatology in regards to treatment of Hepatitis B on 10/18

## 2018-10-12 NOTE — DISCHARGE NOTE ADULT - PATIENT PORTAL LINK FT
You can access the [facility], offered by [facility], by registering with the following website: [email or web address]

## 2018-10-12 NOTE — DISCHARGE NOTE ADULT - CARE PROVIDER_API CALL
Jose Daniel Davis), Gastroenterology; Internal Medicine; Transplant Hepatology  [address] [place] [address]  Phone: [phone]  Fax: [phone]

## 2018-10-12 NOTE — DISCHARGE NOTE ADULT - MEDICATION SUMMARY - MEDICATIONS TO TAKE
I will START or STAY ON the medications listed below when I get home from the hospital:    aspirin 81 mg oral delayed release tablet  -- 1 tab(s) by mouth once a day  -- Indication: For CAD (coronary artery disease)    nortriptyline 50 mg oral capsule  -- 1 tab(s) by mouth once a day  -- Indication: For Mood disorder    metFORMIN 1000 mg oral tablet  -- 1 tab(s) by mouth 2 times a day  -- Indication: For DM2 (diabetes mellitus, type 2)    atorvastatin 40 mg oral tablet  -- 1 tab(s) by mouth once a day (at bedtime)  -- Indication: For Hyperlipidemia    amLODIPine 2.5 mg oral tablet  -- 1 tab(s) by mouth once a day  -- Indication: For Essential hypertension    lactulose 10 g/15 mL oral syrup  -- 30 milliliter(s) by mouth 3 times a day  -- Indication: For Cirrhosis of liver due to hepatitis B    omeprazole 40 mg oral delayed release capsule  -- 1 cap(s) by mouth once a day  -- Indication: For GERD    folic acid 1 mg oral tablet  -- 1 tab(s) by mouth once a day  -- Indication: For Supplement

## 2018-10-14 LAB
PTH RELATED PROT SERPL-MCNC: <2 — SIGNIFICANT CHANGE UP
SMOOTH MUSCLE AB SER-ACNC: SIGNIFICANT CHANGE UP

## 2018-10-17 LAB — HBV E AB SER-ACNC: POSITIVE — SIGNIFICANT CHANGE UP

## 2018-10-18 ENCOUNTER — APPOINTMENT (OUTPATIENT)
Dept: GASTROENTEROLOGY | Facility: HOSPITAL | Age: 63
End: 2018-10-18

## 2018-11-25 NOTE — PHYSICAL THERAPY INITIAL EVALUATION ADULT - SITTING BALANCE: DYNAMIC
Ventricular Rate : 69   Atrial Rate : 69   P-R Interval : 170   QRS Duration : 118   Q-T Interval : 446   QTC Calculation(Bezet) : 477   P Axis : 24   R Axis : -20   T Axis : 13   Diagnosis : Sinus rhythm with occasional Premature ventricular complexes~Non-specific intra-ventricular conduction delay~Borderline ECG~When compared with ECG of 19-APR-2016 02:14,~Premature ventricular complexes are now Present~Confirmed by MACY ERNST, REINALDO ([id number]) on 4/12/2017 3:55:39 PM      good balance

## 2020-03-06 ENCOUNTER — EMERGENCY (EMERGENCY)
Facility: HOSPITAL | Age: 65
LOS: 1 days | Discharge: HOME | End: 2020-03-06
Attending: EMERGENCY MEDICINE | Admitting: STUDENT IN AN ORGANIZED HEALTH CARE EDUCATION/TRAINING PROGRAM
Payer: MEDICARE

## 2020-03-06 VITALS
RESPIRATION RATE: 16 BRPM | DIASTOLIC BLOOD PRESSURE: 96 MMHG | HEART RATE: 16 BPM | SYSTOLIC BLOOD PRESSURE: 153 MMHG | TEMPERATURE: 98 F | OXYGEN SATURATION: 100 %

## 2020-03-06 VITALS
SYSTOLIC BLOOD PRESSURE: 153 MMHG | RESPIRATION RATE: 20 BRPM | HEART RATE: 95 BPM | OXYGEN SATURATION: 100 % | TEMPERATURE: 98 F | DIASTOLIC BLOOD PRESSURE: 94 MMHG

## 2020-03-06 DIAGNOSIS — Z87.442 PERSONAL HISTORY OF URINARY CALCULI: Chronic | ICD-10-CM

## 2020-03-06 LAB
ALBUMIN SERPL ELPH-MCNC: 3.2 G/DL — LOW (ref 3.3–5)
ALP SERPL-CCNC: 186 U/L — HIGH (ref 40–120)
ALT FLD-CCNC: 27 U/L — SIGNIFICANT CHANGE UP (ref 4–33)
ANION GAP SERPL CALC-SCNC: 17 MMO/L — HIGH (ref 7–14)
APPEARANCE UR: CLEAR — SIGNIFICANT CHANGE UP
AST SERPL-CCNC: 45 U/L — HIGH (ref 4–32)
BASOPHILS # BLD AUTO: 0.04 K/UL — SIGNIFICANT CHANGE UP (ref 0–0.2)
BASOPHILS NFR BLD AUTO: 0.9 % — SIGNIFICANT CHANGE UP (ref 0–2)
BILIRUB SERPL-MCNC: 1.1 MG/DL — SIGNIFICANT CHANGE UP (ref 0.2–1.2)
BILIRUB UR-MCNC: NEGATIVE — SIGNIFICANT CHANGE UP
BLOOD UR QL VISUAL: NEGATIVE — SIGNIFICANT CHANGE UP
BUN SERPL-MCNC: 5 MG/DL — LOW (ref 7–23)
CALCIUM SERPL-MCNC: 9.4 MG/DL — SIGNIFICANT CHANGE UP (ref 8.4–10.5)
CHLORIDE SERPL-SCNC: 102 MMOL/L — SIGNIFICANT CHANGE UP (ref 98–107)
CO2 SERPL-SCNC: 20 MMOL/L — LOW (ref 22–31)
COLOR SPEC: SIGNIFICANT CHANGE UP
CREAT SERPL-MCNC: 0.66 MG/DL — SIGNIFICANT CHANGE UP (ref 0.5–1.3)
EOSINOPHIL # BLD AUTO: 0.13 K/UL — SIGNIFICANT CHANGE UP (ref 0–0.5)
EOSINOPHIL NFR BLD AUTO: 3.1 % — SIGNIFICANT CHANGE UP (ref 0–6)
GLUCOSE SERPL-MCNC: 216 MG/DL — HIGH (ref 70–99)
GLUCOSE UR-MCNC: NEGATIVE — SIGNIFICANT CHANGE UP
HCT VFR BLD CALC: 37.5 % — SIGNIFICANT CHANGE UP (ref 34.5–45)
HGB BLD-MCNC: 11.8 G/DL — SIGNIFICANT CHANGE UP (ref 11.5–15.5)
IMM GRANULOCYTES NFR BLD AUTO: 0.2 % — SIGNIFICANT CHANGE UP (ref 0–1.5)
KETONES UR-MCNC: NEGATIVE — SIGNIFICANT CHANGE UP
LEUKOCYTE ESTERASE UR-ACNC: NEGATIVE — SIGNIFICANT CHANGE UP
LIDOCAIN IGE QN: 56.7 U/L — SIGNIFICANT CHANGE UP (ref 7–60)
LYMPHOCYTES # BLD AUTO: 1.13 K/UL — SIGNIFICANT CHANGE UP (ref 1–3.3)
LYMPHOCYTES # BLD AUTO: 26.7 % — SIGNIFICANT CHANGE UP (ref 13–44)
MCHC RBC-ENTMCNC: 27.3 PG — SIGNIFICANT CHANGE UP (ref 27–34)
MCHC RBC-ENTMCNC: 31.5 % — LOW (ref 32–36)
MCV RBC AUTO: 86.8 FL — SIGNIFICANT CHANGE UP (ref 80–100)
MONOCYTES # BLD AUTO: 0.4 K/UL — SIGNIFICANT CHANGE UP (ref 0–0.9)
MONOCYTES NFR BLD AUTO: 9.5 % — SIGNIFICANT CHANGE UP (ref 2–14)
NEUTROPHILS # BLD AUTO: 2.52 K/UL — SIGNIFICANT CHANGE UP (ref 1.8–7.4)
NEUTROPHILS NFR BLD AUTO: 59.6 % — SIGNIFICANT CHANGE UP (ref 43–77)
NITRITE UR-MCNC: NEGATIVE — SIGNIFICANT CHANGE UP
NRBC # FLD: 0 K/UL — SIGNIFICANT CHANGE UP (ref 0–0)
PH UR: 7 — SIGNIFICANT CHANGE UP (ref 5–8)
PLATELET # BLD AUTO: 121 K/UL — LOW (ref 150–400)
PMV BLD: 10.6 FL — SIGNIFICANT CHANGE UP (ref 7–13)
POTASSIUM SERPL-MCNC: 3.6 MMOL/L — SIGNIFICANT CHANGE UP (ref 3.5–5.3)
POTASSIUM SERPL-SCNC: 3.6 MMOL/L — SIGNIFICANT CHANGE UP (ref 3.5–5.3)
PROT SERPL-MCNC: 7.6 G/DL — SIGNIFICANT CHANGE UP (ref 6–8.3)
PROT UR-MCNC: NEGATIVE — SIGNIFICANT CHANGE UP
RBC # BLD: 4.32 M/UL — SIGNIFICANT CHANGE UP (ref 3.8–5.2)
RBC # FLD: 15.1 % — HIGH (ref 10.3–14.5)
SODIUM SERPL-SCNC: 139 MMOL/L — SIGNIFICANT CHANGE UP (ref 135–145)
SP GR SPEC: 1.01 — SIGNIFICANT CHANGE UP (ref 1–1.04)
UROBILINOGEN FLD QL: NORMAL — SIGNIFICANT CHANGE UP
WBC # BLD: 4.23 K/UL — SIGNIFICANT CHANGE UP (ref 3.8–10.5)
WBC # FLD AUTO: 4.23 K/UL — SIGNIFICANT CHANGE UP (ref 3.8–10.5)

## 2020-03-06 PROCEDURE — 74177 CT ABD & PELVIS W/CONTRAST: CPT | Mod: 26

## 2020-03-06 PROCEDURE — 99284 EMERGENCY DEPT VISIT MOD MDM: CPT

## 2020-03-06 NOTE — ED PROVIDER NOTE - NSFOLLOWUPINSTRUCTIONS_ED_ALL_ED_FT
Please follow up with your primary care physician in 24-48 hours  Please follow up with your Gastroenterologist as discussed   A copy of your results have been provided to you  Please come back if any of the following: Fever, chest pain, shortness of breath, headache, changes in vision, mental status, worsening pain or any major concern

## 2020-03-06 NOTE — ED PROVIDER NOTE - PROGRESS NOTE DETAILS
Lowell DAVIS: Patient reassessed and endorse complete resolution of pain. Successfully PO challenged and ready to be discharged. Astrid Evangelista M.D: pt was signed out to me pending CTA and labs. CTAP with signs of cirrhosis, but no portal vein thrombosis. pt reassessed, feeling better, tolerating PO. clear for DC.

## 2020-03-06 NOTE — ED PROVIDER NOTE - CLINICAL SUMMARY MEDICAL DECISION MAKING FREE TEXT BOX
CTA abdomen/pelvis to confirm portal vein thrombosis; labs, pain control; vascular consult if this is thrombosis and likely heparin drip; TBA

## 2020-03-06 NOTE — ED PROVIDER NOTE - PATIENT PORTAL LINK FT
You can access the FollowMyHealth Patient Portal offered by [facility] by registering at the following website: [email or web address]. By joining [email or web address]’s FollowMyHealth portal, you will also be able to view your health information using other applications (apps) compatible with our system.

## 2020-03-06 NOTE — ED PROVIDER NOTE - OBJECTIVE STATEMENT
65 yo F with HTN, HLP, DM, Hep B here BIB family p/w 3 weeks of generalized abdominal pain and results of an abdominal ultrasound showing a possible portal vein thrombosis/occlusion, cirrhosis, splenomegaly. The pt was told to come to the ED by Dr. Hopkins. Denies f/c, sob, cp, n/v, diarrhea, constipation.

## 2020-03-07 LAB
CULTURE RESULTS: SIGNIFICANT CHANGE UP
SPECIMEN SOURCE: SIGNIFICANT CHANGE UP

## 2020-11-20 NOTE — H&P ADULT - NSHPLANGLIMITEDENGLISH_GEN_A_CORE
Lashon Ponce  Dear Dr. Batsheva Rodríguez     Thank you for requesting ultrasound evaluation and maternal fetal medicine consultation on your patient Ron Bentley.   As you are aware she is a 35year old female  with a s dinner.     Continued medication use and capillary blood glucose assessments were reviewed as well as recommendations for serial growth ultrasounds and antepartum testing.     IMPRESSION:  · IUP at 37w4d  · AMA: low-risk cell free fetal DNA, declined invas No

## 2022-05-26 NOTE — PHYSICAL THERAPY INITIAL EVALUATION ADULT - PRECAUTIONS/LIMITATIONS, REHAB EVAL
cardiac precautions Metronidazole Pregnancy And Lactation Text: This medication is Pregnancy Category B and considered safe during pregnancy.  It is also excreted in breast milk.

## 2023-07-19 ENCOUNTER — LABORATORY RESULT (OUTPATIENT)
Age: 68
End: 2023-07-19

## 2023-07-19 ENCOUNTER — APPOINTMENT (OUTPATIENT)
Dept: TRANSPLANT | Facility: CLINIC | Age: 68
End: 2023-07-19

## 2023-07-19 ENCOUNTER — NON-APPOINTMENT (OUTPATIENT)
Age: 68
End: 2023-07-19

## 2023-07-19 ENCOUNTER — APPOINTMENT (OUTPATIENT)
Dept: TRANSPLANT | Facility: CLINIC | Age: 68
End: 2023-07-19
Payer: COMMERCIAL

## 2023-07-19 ENCOUNTER — APPOINTMENT (OUTPATIENT)
Dept: NEPHROLOGY | Facility: CLINIC | Age: 68
End: 2023-07-19
Payer: COMMERCIAL

## 2023-07-19 VITALS
DIASTOLIC BLOOD PRESSURE: 72 MMHG | SYSTOLIC BLOOD PRESSURE: 130 MMHG | HEART RATE: 54 BPM | OXYGEN SATURATION: 99 % | HEIGHT: 61 IN | WEIGHT: 143 LBS | RESPIRATION RATE: 14 BRPM | BODY MASS INDEX: 27 KG/M2 | TEMPERATURE: 98 F

## 2023-07-19 DIAGNOSIS — Z01.818 ENCOUNTER FOR OTHER PREPROCEDURAL EXAMINATION: ICD-10-CM

## 2023-07-19 PROCEDURE — 99205 OFFICE O/P NEW HI 60 MIN: CPT

## 2023-07-19 NOTE — ASSESSMENT
[Fair candidate] : a fair candidate. We should proceed with our protocol for evaluation for kidney transplantation. [FreeTextEntry1] : looks frail, but claims she is just tired. ambulates with cane\par +cirrhosis and splenomegaly on CT from 2020. Needs repeat, f/u lft's, hepatology eval.\par Needs cardiology eval. Already had stress test a few weeks ago which son states was normal\par CXR, CT abd/pel without contrast\par Malignancy screening. Had mammogram 2 weeks ago\par Routine transplant workup

## 2023-07-19 NOTE — REVIEW OF SYSTEMS
[Sclera anicteric] : sclera anicteric [Can Walk (___ Blocks)] : can walk [unfilled] blocks [Fever] : no fever [Chest Pain] : no chest pain [SOB] : no shortness of breath [Abdominal Pain] : no abdominal pain [Dysuria] : no dysuria [Hematuria] : no hematuria

## 2023-07-19 NOTE — HISTORY OF PRESENT ILLNESS
[Diabetes Mellitus] : Diabetes Mellitus [Hypertension] : Hypertension [Blood Transfusion] : prior blood transfusion [Diabetes] : diabetes [Not Working] : Not working [80: Normal activity with effort; some signs or symptoms of disease.] : 80: Normal activity with effort; some signs or symptoms of disease.  [Previous Kidney Transplant] : no previous kidney transplant [Cardiac History] : no cardiac history [Claudication/Angina] : no claudication/angina [Hx of DVT/Thrombosis/Miscarriage] : no history of dvt/thrombosis/miscarriage [Retinopathy] : no retinopathy [Neuropathy] : no neuropathy [Lower Extremity Ulcers] : no lower extremity ulcers [Insulin] : no insulin treatment [TextBox_16] : n/a [TextBox_20] : 3/2023 [TextBox_24] : 10 years [TextBox_28] : 10 years [TextBox_30] : 2-3 blocks [de-identified] : 68F with recently diagnosed CKD for evaluation for kidney transplant.\par Patient found to have elevated Cr a few months ago on routine blood work\par Recent BUN 28, Cr 2.96, GFR 17\par CT from 2020 showed cirrhosis and splenomegaly, no ascites\par \par Listed elsewhere - No\par Dx of kidney disease - 3/2023\par HD - no\par Living Donor - No\par \par PMH: HTN, DM, chronic anemia, Hepatitis B\par PSH: Cholecystectomy\par Meds: Januvia 50 mg daily\par Amlodipine 10 mg daily\par Labetalol 50 mg twice daily\par Vemlidy 25 mg daily\par Nortriptyline 50 mg daily\par Xifaxin 550 mg BID\par Ferrous 325 mg daily\par Donepezil 10 mg daily\par Folic acid 1 mg daily\par Levothyroxine 25 mcg daily\par MVI daily\par omeprazole 40 mg as needed\par Lactulose as needed BID\par Furosemide 20 mg BID\par PhosLo 667 mg TID. \par Allergies: NKDA\par Social: Denies tob, etoh, drugs\par Lives with her son (45). Has 4 other children\par FMH: 6 siblings, some with DM\par No kidney disease\par Mother d. liver CA 71yo\par \par ROS: Cardiac - no MI, CHF, CAD\par Pulmonary- no h/o COPD, Asthma, or pneumonia\par Infections- no h/o TB, HIV, Hepatitis. +vaccinated for covid. \par Heme- no h/o malignancy or bleeding disorders. No DVT/PE\par Endo- Diabetes,  No insulin, without retinopathy or neuropathy. No Thyroid disease\par Neuro- no h/o CVA or seizures. \par Sensitization events- ? previous blood transfusion in [facility]; No previous transplant\par No infections or hospitalizations in the last 6 months\par  - No UTI or Kidney stones. Makes normal amount of urine\par GI - Last colonoscopy few years ago\par

## 2023-07-19 NOTE — REASON FOR VISIT
[Initial] : an initial visit for [Kidney Transplant Evaluation] : kidney transplant evaluation [Family Member] : family member [FreeTextEntry2] : Dr. SAMARA Baez

## 2023-07-19 NOTE — PHYSICAL EXAM
[No Acute Distress] : no acute distress [Sclera Anicteric] : sclera anicteric [Clear to Auscultation] : clear to auscultation [Breathing Comfortably on RA] : breathing comfortably on room air [Normal Rate] : normal rate [Regular Rhythm] : regular rhythm [Soft] : soft [Non-tender] : non-tender [No] : no fistula/graft [] : right dorsalis pedis palpable [Normal] : normal [FreeTextEntry1] : No dentures [TextBox_34] : 1+ edema b/l LE, no ulcers [TextBox_86] : No inguinal lymphadenopathy

## 2023-07-24 ENCOUNTER — NON-APPOINTMENT (OUTPATIENT)
Age: 68
End: 2023-07-24

## 2023-07-24 LAB
ABO + RH PNL BLD: NORMAL
ABO + RH PNL BLD: NORMAL
AFP-TM SERPL-MCNC: 5.9 NG/ML
ALBUMIN SERPL ELPH-MCNC: 4.4 G/DL
ALP BLD-CCNC: 158 U/L
ALT SERPL-CCNC: 17 U/L
ANION GAP SERPL CALC-SCNC: 14 MMOL/L
APPEARANCE: CLEAR
AST SERPL-CCNC: 31 U/L
BACTERIA: NEGATIVE /HPF
BILIRUB SERPL-MCNC: 1 MG/DL
BILIRUBIN URINE: NEGATIVE
BLOOD URINE: NEGATIVE
BUN SERPL-MCNC: 21 MG/DL
C PEPTIDE SERPL-MCNC: 8 NG/ML
CALCIUM SERPL-MCNC: 10 MG/DL
CAST: 2 /LPF
CHLORIDE SERPL-SCNC: 96 MMOL/L
CHOLEST SERPL-MCNC: 176 MG/DL
CMV IGG SERPL QL: >10 U/ML
CMV IGG SERPL-IMP: POSITIVE
CO2 SERPL-SCNC: 26 MMOL/L
COLOR: YELLOW
COVID-19 SPIKE DOMAIN ANTIBODY INTERPRETATION: POSITIVE
CREAT SERPL-MCNC: 2.78 MG/DL
CREAT SPEC-SCNC: 72 MG/DL
CREAT/PROT UR: 0.2 RATIO
EBV DNA SERPL NAA+PROBE-ACNC: NOT DETECTED IU/ML
EBV EA AB SER IA-ACNC: 92.7 U/ML
EBV EA AB TITR SER IF: POSITIVE
EBV EA IGG SER QL IA: >600 U/ML
EBV EA IGG SER-ACNC: POSITIVE
EBV EA IGM SER IA-ACNC: NEGATIVE
EBV PATRN SPEC IB-IMP: NORMAL
EBV VCA IGG SER IA-ACNC: >750 U/ML
EBV VCA IGM SER QL IA: <10 U/ML
EBVPCR LOG: NOT DETECTED LOG10IU/ML
EGFR: 18 ML/MIN/1.73M2
EPITHELIAL CELLS: 3 /HPF
EPSTEIN-BARR VIRUS CAPSID ANTIGEN IGG: POSITIVE
ESTIMATED AVERAGE GLUCOSE: 174 MG/DL
GLUCOSE QUALITATIVE U: 500 MG/DL
GLUCOSE SERPL-MCNC: 373 MG/DL
HAV IGM SER QL: NONREACTIVE
HBA1C MFR BLD HPLC: 7.7 %
HBV CORE IGG+IGM SER QL: REACTIVE
HBV SURFACE AB SER QL: NONREACTIVE
HBV SURFACE AB SERPL IA-ACNC: <3 MIU/ML
HBV SURFACE AG SER QL: REACTIVE
HCG SERPL-MCNC: 4 MIU/ML
HCV AB SER QL: NONREACTIVE
HCV S/CO RATIO: 0.14 S/CO
HDLC SERPL-MCNC: 62 MG/DL
HIV1+2 AB SPEC QL IA.RAPID: NONREACTIVE
HSV 1+2 IGG SER IA-IMP: NEGATIVE
HSV 1+2 IGG SER IA-IMP: POSITIVE
HSV1 IGG SER QL: 59.3 INDEX
HSV2 IGG SER QL: 0.6 INDEX
KETONES URINE: NEGATIVE MG/DL
LDLC SERPL CALC-MCNC: 94 MG/DL
LEUKOCYTE ESTERASE URINE: ABNORMAL
M TB IFN-G BLD-IMP: NEGATIVE
MAGNESIUM SERPL-MCNC: 2.2 MG/DL
MICROSCOPIC-UA: NORMAL
NITRITE URINE: NEGATIVE
NONHDLC SERPL-MCNC: 114 MG/DL
PH URINE: 6.5
PHOSPHATE SERPL-MCNC: 3.2 MG/DL
POTASSIUM SERPL-SCNC: 3.3 MMOL/L
PROT SERPL-MCNC: 8.7 G/DL
PROT UR-MCNC: 16 MG/DL
PROTEIN URINE: NORMAL MG/DL
QUANTIFERON TB PLUS MITOGEN MINUS NIL: 1.78 IU/ML
QUANTIFERON TB PLUS NIL: 0.03 IU/ML
QUANTIFERON TB PLUS TB1 MINUS NIL: 0 IU/ML
QUANTIFERON TB PLUS TB2 MINUS NIL: 0 IU/ML
RED BLOOD CELLS URINE: 0 /HPF
ROGOSIN: NORMAL
RUBV IGG FLD-ACNC: 27.8 INDEX
RUBV IGG SER-IMP: POSITIVE
SARS-COV-2 AB SERPL IA-ACNC: >250 U/ML
SODIUM SERPL-SCNC: 135 MMOL/L
SPECIFIC GRAVITY URINE: 1.01
T GONDII AB SER-IMP: POSITIVE
T GONDII IGG SER QL: 48 IU/ML
T PALLIDUM AB SER QL IA: NEGATIVE
TRIGL SERPL-MCNC: 114 MG/DL
URATE SERPL-MCNC: 6.6 MG/DL
UROBILINOGEN URINE: 0.2 MG/DL
VZV AB TITR SER: POSITIVE
VZV IGG SER IF-ACNC: 1641 INDEX
WHITE BLOOD CELLS URINE: 1 /HPF

## 2023-07-25 ENCOUNTER — APPOINTMENT (OUTPATIENT)
Dept: CARDIOLOGY | Facility: CLINIC | Age: 68
End: 2023-07-25

## 2023-07-28 LAB
ALPHA-1-FETOPROTEIN-L3: 10.4 %
ALPHA-1-FETOPROTEIN: 7 NG/ML

## 2023-09-07 ENCOUNTER — LABORATORY RESULT (OUTPATIENT)
Age: 68
End: 2023-09-07

## 2023-09-07 ENCOUNTER — APPOINTMENT (OUTPATIENT)
Dept: HEPATOLOGY | Facility: CLINIC | Age: 68
End: 2023-09-07
Payer: MEDICARE

## 2023-09-07 VITALS
BODY MASS INDEX: 26.43 KG/M2 | SYSTOLIC BLOOD PRESSURE: 118 MMHG | WEIGHT: 140 LBS | OXYGEN SATURATION: 98 % | TEMPERATURE: 98.4 F | DIASTOLIC BLOOD PRESSURE: 72 MMHG | HEART RATE: 66 BPM | HEIGHT: 61 IN

## 2023-09-07 DIAGNOSIS — K74.69 OTHER CIRRHOSIS OF LIVER: ICD-10-CM

## 2023-09-07 PROCEDURE — 99204 OFFICE O/P NEW MOD 45 MIN: CPT

## 2023-09-07 RX ORDER — TENOFOVIR ALAFENAMIDE 25 MG/1
25 TABLET ORAL
Qty: 30 | Refills: 6 | Status: ACTIVE | COMMUNITY
Start: 2023-09-07 | End: 1900-01-01

## 2023-09-07 NOTE — PHYSICAL EXAM
[Scleral Icterus] : No Scleral Icterus [Abdominal  Ascites] : no ascites [Asterixis] : no asterixis observed [Jaundice] : No jaundice [Palmar Erythema] : no Palmar Erythema [General Appearance - Alert] : alert [General Appearance - In No Acute Distress] : in no acute distress [Sclera] : the sclera and conjunctiva were normal [] : no respiratory distress [Respiration, Rhythm And Depth] : normal respiratory rhythm and effort [Edema] : there was no peripheral edema [Abdomen Soft] : soft [Abdomen Tenderness] : non-tender [Abdomen Mass (___ Cm)] : no abdominal mass palpated [Abdomen Hernia] : no hernia was discovered

## 2023-09-07 NOTE — ASSESSMENT
[FreeTextEntry1] : 69 y/o F with Type 2 DM, HTN, chronic anemia, Dementia, CKD being evaluated for kidney transplant here to establish care for chronic hepatitis B with cirrhosis.  # Chronic hepatitis B with cirrhosis -HBV dx in 2018 with cirrhosis based on imaging. HIV and HCV neg -Currently on Vemlidy 25 mg daily since 2018, advised to continue and refills given.  -BW ordered to check for HBVDNA level and HDV Ab   > Esophageal / gastric varices - EGD on 7/23/2018 no EV. will need a repeat once her diabetes under better control.   > Ascites - no h/o ascites. Currently taking diuretics for LLE.  > Hepatic encephalopathy - none on exam today   > HCC screening - none on abdo 3/20/23 however AFP-L3 mildly elevated 10.4. Recommended abdo MRI to evaluate for HCC.   > Portal vein thrombosis - none  > Transplant candidate -BW ordered for MELD NA  Plan: BW today, abdo MRI, RTC 1 month

## 2023-09-07 NOTE — HISTORY OF PRESENT ILLNESS
[Hospitalization] : ~he/she~ was hospitalized [de-identified] : at [facility] 8/18-8/22 with hyperglycemia [de-identified] : Accompanied by son 69 y/o F with Type 2 DM, HTN, chronic anemia, Dementia, CKD being evaluated for kidney transplant here to establish care for chronic hepatitis B with cirrhosis. She was hosp at [facility] in 2018 for AMS and was Dx HBV with cirrhosis based on imaging. She was started on Vemlidy 25 mg daily. She was being followed by GI clinic. Never had ascites or EV bleeding.    She was recently hospitalized at [facility] 8/18-8/22 with hyperglycemia. No mental status changes as per son. Currently taking lactulose + Xifaxan.  No fluid overload. H/o LLE currently on diuretics.  Denies abdominal pain, nausea, vomiting, melena, hematochezia, or hematemesis. Was born in [facility] and came [place] the [place] about 15[place] ago. Had blood transfusion in [facility] in 2005 or 2006.  No family h/o liver disease. Her family/children have been checked for HBV, all tested neg. Never drinks alcohol.   Past Liver workup: -EGD on 7/23/2018 no EV. -BW 10/11/18 AMA, RUBY, SMA wnl -Abdo US 3/20/23 cirrhosis, no focal hepatic lesion -Abdo CT 3/6/20 Patent portal vein.Cirrhosis with splenomegaly and varices with spontaneous splenorenal shunt compatible with portal hypertension. -MRI 10/12/18 Cirrhosis with portal HTN, scattered subcm hepatic cysts.

## 2023-09-08 LAB
ALBUMIN SERPL ELPH-MCNC: 4.1 G/DL
ALP BLD-CCNC: 132 U/L
ALT SERPL-CCNC: 22 U/L
ANION GAP SERPL CALC-SCNC: 14 MMOL/L
AST SERPL-CCNC: 39 U/L
BILIRUB SERPL-MCNC: 0.9 MG/DL
BUN SERPL-MCNC: 21 MG/DL
CALCIUM SERPL-MCNC: 10.4 MG/DL
CHLORIDE SERPL-SCNC: 104 MMOL/L
CO2 SERPL-SCNC: 20 MMOL/L
CREAT SERPL-MCNC: 2.53 MG/DL
EGFR: 20 ML/MIN/1.73M2
HBV E AB SER QL: REACTIVE
HBV E AG SER QL: NONREACTIVE
HBV SURFACE AB SER QL: NONREACTIVE
HBV SURFACE AG SER QL: REACTIVE
HCT VFR BLD CALC: 36.9 %
HEPATITIS A IGG ANTIBODY: REACTIVE
HGB BLD-MCNC: 11.9 G/DL
INR PPP: 1.15 RATIO
MCHC RBC-ENTMCNC: 28.8 PG
MCHC RBC-ENTMCNC: 32.2 GM/DL
MCV RBC AUTO: 89.3 FL
PLATELET # BLD AUTO: 156 K/UL
POTASSIUM SERPL-SCNC: 3.4 MMOL/L
PROT SERPL-MCNC: 7.9 G/DL
PT BLD: 12.9 SEC
RBC # BLD: 4.13 M/UL
RBC # FLD: 14.6 %
SODIUM SERPL-SCNC: 138 MMOL/L
WBC # FLD AUTO: 7.75 K/UL

## 2023-09-11 LAB
HBV DNA # SERPL NAA+PROBE: 21 IU/ML
HEPB DNA PCR INT: DETECTED
HEPB DNA PCR LOG: 1.31 LOGIU/ML

## 2023-09-14 LAB — HDV AB SER IA-ACNC: NEGATIVE

## 2023-09-19 ENCOUNTER — APPOINTMENT (OUTPATIENT)
Dept: MRI IMAGING | Facility: CLINIC | Age: 68
End: 2023-09-19
Payer: MEDICARE

## 2023-09-19 ENCOUNTER — OUTPATIENT (OUTPATIENT)
Dept: OUTPATIENT SERVICES | Facility: HOSPITAL | Age: 68
LOS: 1 days | End: 2023-09-19
Payer: MEDICARE

## 2023-09-19 DIAGNOSIS — B18.1 CHRONIC VIRAL HEPATITIS B WITHOUT DELTA-AGENT: ICD-10-CM

## 2023-09-19 DIAGNOSIS — Z87.442 PERSONAL HISTORY OF URINARY CALCULI: Chronic | ICD-10-CM

## 2023-09-19 DIAGNOSIS — R77.2 ABNORMALITY OF ALPHAFETOPROTEIN: ICD-10-CM

## 2023-09-19 PROCEDURE — 74183 MRI ABD W/O CNTR FLWD CNTR: CPT | Mod: 26

## 2023-09-19 PROCEDURE — 74183 MRI ABD W/O CNTR FLWD CNTR: CPT

## 2023-10-09 ENCOUNTER — APPOINTMENT (OUTPATIENT)
Dept: HEPATOLOGY | Facility: CLINIC | Age: 68
End: 2023-10-09
Payer: MEDICARE

## 2023-10-09 VITALS
OXYGEN SATURATION: 96 % | DIASTOLIC BLOOD PRESSURE: 79 MMHG | WEIGHT: 143 LBS | SYSTOLIC BLOOD PRESSURE: 118 MMHG | BODY MASS INDEX: 27 KG/M2 | HEIGHT: 61 IN | HEART RATE: 56 BPM

## 2023-10-09 DIAGNOSIS — R77.2 ABNORMALITY OF ALPHAFETOPROTEIN: ICD-10-CM

## 2023-10-09 PROCEDURE — 99214 OFFICE O/P EST MOD 30 MIN: CPT

## 2023-10-11 ENCOUNTER — NON-APPOINTMENT (OUTPATIENT)
Age: 68
End: 2023-10-11

## 2023-10-31 ENCOUNTER — APPOINTMENT (OUTPATIENT)
Dept: HEPATOLOGY | Facility: CLINIC | Age: 68
End: 2023-10-31
Payer: MEDICARE

## 2023-10-31 VITALS
TEMPERATURE: 97.1 F | HEART RATE: 56 BPM | BODY MASS INDEX: 27 KG/M2 | WEIGHT: 143 LBS | SYSTOLIC BLOOD PRESSURE: 120 MMHG | OXYGEN SATURATION: 96 % | HEIGHT: 61 IN | DIASTOLIC BLOOD PRESSURE: 70 MMHG

## 2023-10-31 DIAGNOSIS — K74.60 UNSPECIFIED CIRRHOSIS OF LIVER: ICD-10-CM

## 2023-10-31 DIAGNOSIS — B18.1 CHRONIC VIRAL HEPATITIS B W/OUT DELTA-AGENT: ICD-10-CM

## 2023-10-31 PROCEDURE — 99214 OFFICE O/P EST MOD 30 MIN: CPT

## 2023-11-02 ENCOUNTER — NON-APPOINTMENT (OUTPATIENT)
Age: 68
End: 2023-11-02

## 2023-11-02 LAB
AFP-TM SERPL-MCNC: 5.9 NG/ML
ALBUMIN SERPL ELPH-MCNC: 4 G/DL
ALP BLD-CCNC: 114 U/L
ALT SERPL-CCNC: 21 U/L
ANION GAP SERPL CALC-SCNC: 13 MMOL/L
AST SERPL-CCNC: 33 U/L
BILIRUB SERPL-MCNC: 0.8 MG/DL
BUN SERPL-MCNC: 20 MG/DL
CALCIUM SERPL-MCNC: 10 MG/DL
CHLORIDE SERPL-SCNC: 103 MMOL/L
CO2 SERPL-SCNC: 21 MMOL/L
CREAT SERPL-MCNC: 2.62 MG/DL
EGFR: 19 ML/MIN/1.73M2
GLUCOSE SERPL-MCNC: 163 MG/DL
HBV DNA # SERPL NAA+PROBE: <10 IU/ML
HBV SURFACE AG SER QL: REACTIVE
HCT VFR BLD CALC: 36.1 %
HEPB DNA PCR INT: DETECTED
HEPB DNA PCR LOG: <1 LOGIU/ML
HGB BLD-MCNC: 12 G/DL
INR PPP: 1.12 RATIO
MCHC RBC-ENTMCNC: 28.8 PG
MCHC RBC-ENTMCNC: 33.2 GM/DL
MCV RBC AUTO: 86.6 FL
PLATELET # BLD AUTO: 144 K/UL
POTASSIUM SERPL-SCNC: 3.9 MMOL/L
PROT SERPL-MCNC: 7.7 G/DL
PT BLD: 12.6 SEC
RBC # BLD: 4.17 M/UL
RBC # FLD: 13.9 %
SODIUM SERPL-SCNC: 137 MMOL/L
WBC # FLD AUTO: 7.4 K/UL

## 2023-12-28 NOTE — PHYSICAL EXAM
[General Appearance - Alert] : alert [General Appearance - In No Acute Distress] : in no acute distress [Sclera] : the sclera and conjunctiva were normal [Oropharynx] : the oropharynx was normal [Neck Appearance] : the appearance of the neck was normal [Neck Cervical Mass (___cm)] : no neck mass was observed [Jugular Venous Distention Increased] : there was no jugular-venous distention [Respiration, Rhythm And Depth] : normal respiratory rhythm and effort [Exaggerated Use Of Accessory Muscles For Inspiration] : no accessory muscle use [Auscultation Breath Sounds / Voice Sounds] : lungs were clear to auscultation bilaterally [Heart Rate And Rhythm] : heart rate was normal and rhythm regular [Heart Sounds] : normal S1 and S2 [Heart Sounds Gallop] : no gallops [Murmurs] : no murmurs [Heart Sounds Pericardial Friction Rub] : no pericardial rub [] : right dorsalis pedis palpable [No Focal Deficits] : no focal deficits [Oriented To Time, Place, And Person] : oriented to person, place, and time [Bowel Sounds] : normal bowel sounds [Abdomen Tenderness] : non-tender [FreeTextEntry1] : Distended, soft, no shifting dullness or fluid wave

## 2023-12-28 NOTE — HISTORY OF PRESENT ILLNESS
[TextBox_42] : \par Ms. LANE ZAPIEN is a 68 year old woman with CKD stage IV, eGFR 17, followed by Dr. Baez. \par She  has known CKD for 2-3 months. \par Diabetes was initially diagnosed 10 years ago, never on insulin. No neuropathy, no retinopathy, no foot ulcers. \par Hypertension was diagnosed 10 years ago,  has been controlled on medications \par Hepatitis B diagnosed 3 years ago On Vemlidy \par Liver cirrhosis  dx in 2018, no h/o ascites or encephalopathy. \par Anemia \par Memory problems \par \par PSH: Cholecystectomy 15 years ago. \par \par No history of coronary artery disease.\par No known history of peripheral vascular disease, no claudications \par No history of bleeding problems \par No history of DVT or PE \par No history of Stroke, no Seizures\par No history of lung problems including COPD, Asthma, Sleep apnea, pneumonia, bronchitis. \par No history of kidney stones, voiding problems, urological problems, recurrent urinary tract infections, hematuria. \par No history of cancer. \par No history of Tuberculosis of HIV \par \par Sensitizing events \par Has : 5 children, 7 pregnancies. 2 children  1 right after birth and at 1 month. No miscarriages, no . \par Has had blood transfusions \par \par Hospitalizations :- \par -[facility] 6 weeks ago - for low BP , stayed for 4 days \par - 2018 - [facility] for AMS \par \par Exercise tolerance  - Able to walk slowly using a cane, due to balance problems. Can walk 1-2 blocks, Able to climb stairs. \par Dependent on her children for ADL and iADL. \par \par Health Maintenance\par Colonoscopy - 2018, 1 polyp \par COVID vaccine - J&J \par PAP - none recently \par Mammogram - 2 weeks ago. \par \par Cardiac work-up - Stress test, Echo - done within the past month. \par \par Potential live donors -  None at this time \par \par Family HIstory:-  No family history of Kidney disease. No family history of DM \par Father  :   at 75, had stroke, dementia \par Mother  :  of stomach cancer at 70 \par Siblings : 2 brothers and 4 sister. No kidney disease. HTN and DM +  \par Children : 5 children. Healthy \par \par Social history:- She was born in [facility], moved to the  in , lives with son and daughter in law. \par . No smoking,  alcohol or illicit drug use\par \par Home Meds:\par Januvia 50 mg daily\par Amlodipine 10 mg daily\par Labetalol 50 mg twice daily\par Vemlidy 25 mg daily\par Nortriptyline 50 mg daily\par Xifaxin 550 mg BID\par Ferrous 325 mg daily\par Donepezil 10 mg daily\par Folic acid 1 mg daily\par Levothyroxine 25 mcg daily\par MVI daily\par omeprazole 40 mg as needed\par Lactulose as needed BID\par Furosemide 20 mg BID\par PhosLo 667 mg TID. \par \par Herbal supplements, Over the counter medications\par 
perumbil hernia, reducible

## 2023-12-28 NOTE — ASSESSMENT
[Not a candidate] : not a candidate. We should not proceed with our protocol for evaluation for kidney transplantation. [FreeTextEntry1] : 68 yr old woman with CKD stage IV referred for preemptive kidney transplant evaluation. eGFR is 17ml/min  She has h/o long standing DM and HTN both well controlled. No known diabetes complications of neuropathy or retinopathy.  She has history of chronic hepatitis B on Vemlidy. H/o liver cirrhosis, has cirrhosis and liver lesions on imaging in 2018.  She is currently not a candidate for kidney  transplant due to the presence of cirrhosis and possible liver lesions on imaging. Refer to hepatology for evaluation. Send AFP.  Patient may return for evaluation if deemed a candidate for liver transplant (for [facility])

## 2023-12-29 ENCOUNTER — NON-APPOINTMENT (OUTPATIENT)
Age: 68
End: 2023-12-29

## 2024-01-03 NOTE — PATIENT PROFILE ADULT - FUNCTIONAL SCREEN CURRENT LEVEL: COMMUNICATION, MLM
Per LOV : We will complete her workup with esophageal motility study.  I will see her back once study has been performed.  She should continue PPIs            0 = understands/communicates without difficulty

## 2025-03-18 NOTE — SWALLOW BEDSIDE ASSESSMENT ADULT - ASR SWALLOW REFERRAL
Follow Up: Varicose Veins/ Venous Insufficiency    Sixto Alaniz is a 85 year old  male here for followup. He has worn stockings now for 3          months. I saw them previously in March 2025.  Continued progression of disease and symptoms and issues; reviewed ultrasound results. Patient has ongoing symptoms with pain and swelling needing intervention with pain meds secondary to interfering with daily activities and work. This now inhibits daily chores and activities.     Allergies: No Known Allergies     Past Medical History: No past medical history on file.     Past Social History:   Past Surgical History:   Procedure Laterality Date    HEMORRHOIDECTOMY EXTERNAL      OTHER SURGICAL HISTORY      double hernia        CURRENT MEDS:  Current Outpatient Medications   Medication Sig Dispense Refill    ascorbic acid, vitamin C, (ASCORBIC ACID WITH AZALIA HIPS) 500 MG tablet [ASCORBIC ACID, VITAMIN C, (ASCORBIC ACID WITH AZALIA HIPS) 500 MG TABLET] Take 500 mg by mouth daily.      aspirin 81 MG EC tablet [ASPIRIN 81 MG EC TABLET] Take 81 mg by mouth daily.      Ca carb-Ca gluc-Mg ox-Mg gluco (CALCIUM MAGNESIUM) 500 mg calcium -250 mg Tab [CA CARB-CA GLUC-MG OX-MG GLUCO (CALCIUM MAGNESIUM) 500 MG CALCIUM -250 MG TAB] Take by mouth.      calcium-vitamin D 500 mg(1,250mg) -200 unit per tablet [CALCIUM-VITAMIN D 500 MG(1,250MG) -200 UNIT PER TABLET] Take 1 tablet by mouth 2 (two) times a day with meals.      digestive enzymes combo no.7 (SUPERIOR DIGESTIVE ENZYME) cap [DIGESTIVE ENZYMES COMBO NO.7 (SUPERIOR DIGESTIVE ENZYME) CAP] Take by mouth.      flaxseed oil 1,000 mg cap [FLAXSEED OIL 1,000 MG CAP] Take by mouth.      lecithin, soy 400 mg cap [LECITHIN,  MG CAP] Take by mouth.      losartan (COZAAR) 25 MG tablet [LOSARTAN (COZAAR) 25 MG TABLET] Take 1 tablet by mouth daily.  1    metroNIDAZOLE (METROGEL) 1 % gel [METRONIDAZOLE (METROGEL) 1 % GEL] Apply 1 application topically daily.      multivitamin (ONE A  DAY) per tablet [MULTIVITAMIN (ONE A DAY) PER TABLET] Take by mouth.      oxyCODONE-acetaminophen (PERCOCET) 5-325 mg per tablet [OXYCODONE-ACETAMINOPHEN (PERCOCET) 5-325 MG PER TABLET] Take 1 tablet by mouth every 4 (four) hours as needed for pain.      torsemide (DEMADEX) 20 MG tablet PLEASE SEE ATTACHED FOR DETAILED DIRECTIONS      vitamin E 100 UNIT capsule [VITAMIN E 100 UNIT CAPSULE] Take 1 capsule by mouth daily.       No current facility-administered medications for this visit.       Family History   Problem Relation Age of Onset    Diabetes Mother     Heart Disease Father     Diabetes Brother         reports that he has never smoked. He has never used smokeless tobacco.    Review of Systems:  Negative except progression of disease and issues while wearing compression and conservative management.   Patient has symptomatic veins and changes of bilateral legs. These have progressed to the point of causing symptoms on a daily basis. This causes issues with daily activities and chores such as washing dishes, vacuuming, mowing lawn, outdoor upkeep, and standing for long lengths of time. He has worn compression now for greater than 3 months, worked on an exercise and weight loss program and continues to have symptoms.     OBJECTIVE:  There were no vitals filed for this visit.  There is no height or weight on file to calculate BMI.    EXAM:  GENERAL: This is a well-developed 85 year old male who appears his stated age  HEAD: normocephalic  HEENT: Pupils equal and reactive bilaterally  CARDIAC: RRR without murmur  CHEST/LUNG:  Clear to auscultation  ABDOMEN: Soft, nontender, nondistended, no masses    NEUROLOGIC: Focally intact, nonfocal  VASCULAR: Pulses intact, symmetrical upper and lower extremities. Varicose veins, Spider veins, Thrombophlebitis, and Chronic venous stasis changes, bilateral                      Patient presents with mild, bilateral  lower extremity secondary lymphedema.     Patient requires a  standard-fit knee high compression stocking           Imaging:          Exam Information    Exam Date Exam Time Accession # Performing Department Results    3/18/25 11:51 AM [id number] [facility] [place]      PACS Images     Show images for US Venous Competency Bilateral     Study Result    Narrative & Impression   BILATERAL Venous Insufficiency Ultrasound (Date: 03/18/25)    BILATERAL Lower Extremity          Indication: Symptomatic varicose veins/pain/swelling     Previous: 02/05/2018     Patient History: Swelling, Stasis, and Anticoagulants     Presenting Symptoms:  Swelling, Varicose Veins, Pain, and Stasis     Technique:   Supine and Reverse Trendelenburg Ultrasound of the Deep and Superficial Veins with Valsalva and Compression Augmentation Maneuvers. Duplex Imaging is performed utilizing gray-scale, Two-dimensional images, color-flow imaging, Doppler waveform analysis, and Spectral doppler imaging done with provacative maneuvers.      Incompetency Criteria:  Deep vein reflux reported when greater than 1000 msec flow reversal. Superficial vein reflux reported when greater than 500 msec flow reversal.  vein reflux reported as greater than 350 msec flow reversal.      Right  Leg Deep Veins    CFV SFJ DFV PROX FV   PROX FV MID FV DIST POP V. PERON.   V. PTV'S   Compressibility  (FC,PC,NC) FC FC FC FC FC FC FC FC FC   Reflux +   + + + + +   -         Right Leg Superficial Veins  Location SFJ PROX THIGH MID THIGH KNEE MID CALF   GSV (mm) 9 7 7 6 6   Reflux + + + + +   AASV (mm) NV           Reflux NV           PASV (mm) 5           Reflux -              Location SPJ PROX CALF MID CALF   SSV (mm) 6 3 4   Reflux + + +      Left  Leg Deep Veins    CFV SFJ DFV PROX FV   PROX FV MID FV DIST POP V. PERON.   V. PTV'S   Compressibility  (FC,PC,NC) FC FC FC FC FC FC FC FC FC   Reflux -   + + - - -   -         Left Leg Superficial Veins  Location SFJ PROX THIGH MID  THIGH KNEE MID CALF   GSV (mm) 8 7 6 7 5   Reflux + + + + +   AASV (mm) NV           Reflux NV           PASV (mm) 3           Reflux -              Location SPJ PROX CALF MID CALF   SSV (mm) 2 2 3   Reflux - - -      Comments: No incompetent  veins visualized.       Right GSV is straight enough to ablate.     Left GSV is straight enough to ablate.     Impression:       Reference:     Compressibility: FC= Fully compressible, PC= Partially compressible, NC= Non-compressible, NV= Not Visualized     Reflux: (+) Incompetent  (-) Competent, (NV) = Not Visualized     Interpretation criteria:          Duration of Retrograde flow (milliseconds)  Category Deep Veins Superficial Veins  veins   Competent < 1000ms < 500ms < 350ms   Incompetent > 1000ms > 500ms > 350ms          This result has not been signed. Information might be incomplete.       Assessment/Plan:    Symptomatic varicose vein disease  Secondary lymphedema  InSufficiency bilateral greater saphenous veins  Insufficiency of right short or small saphenous vein      He has  incompetency and insufficiency of the Bilateral  Greater  saphenous vein and right small or short saphwenous vein..  Right measures 9 mm at the junction, left measures 8 mm. Deep systems are intact. No DVTs. Great candidate for endovenous  closure. We spent 20 today and discussed the procedure. The risks of anesthesia, infection, bleeding, clotting, DVTs, numbess at the insertion site dermatome and  the process and procedure were discussed. Answered all questions today. Will submit this to Sixto M [employer]EXIS's insurance if needed for pre approval.Will set this up when approved. Discussed need to have a  and procedure woul take around 30 minutes with total time 2-3 hours. Also understands a small screening ultrasound 2-3 days out to rule out clot formation at the closed vessel.    DIAGNOSIS: Venous insufficiency of the  right greater saphenous vein, left greater saphenous  vein, and right short saphenous vein      PROCEDURE: Endovenous closure of the right greater saphenous vein, left greater saphenous vein, and right short saphenous vein    Justyn Gresham MD  General Surgery [phone]  Vascular Surgery [phone]         Registered Dietitian/To facilitate adequate daily caloric intake.